# Patient Record
Sex: MALE | Race: WHITE
[De-identification: names, ages, dates, MRNs, and addresses within clinical notes are randomized per-mention and may not be internally consistent; named-entity substitution may affect disease eponyms.]

---

## 2020-10-29 ENCOUNTER — HOSPITAL ENCOUNTER (INPATIENT)
Dept: HOSPITAL 54 - ER | Age: 50
LOS: 10 days | Discharge: NURSING, REHAB OR LONG-TERM CARE | DRG: 950 | End: 2020-11-08
Attending: INTERNAL MEDICINE | Admitting: INTERNAL MEDICINE
Payer: MEDICAID

## 2020-10-29 VITALS — HEIGHT: 61 IN | WEIGHT: 105 LBS | BODY MASS INDEX: 19.83 KG/M2

## 2020-10-29 VITALS — SYSTOLIC BLOOD PRESSURE: 164 MMHG | DIASTOLIC BLOOD PRESSURE: 70 MMHG

## 2020-10-29 VITALS — DIASTOLIC BLOOD PRESSURE: 70 MMHG | SYSTOLIC BLOOD PRESSURE: 164 MMHG

## 2020-10-29 VITALS — SYSTOLIC BLOOD PRESSURE: 136 MMHG | DIASTOLIC BLOOD PRESSURE: 50 MMHG

## 2020-10-29 VITALS — DIASTOLIC BLOOD PRESSURE: 47 MMHG | SYSTOLIC BLOOD PRESSURE: 119 MMHG

## 2020-10-29 VITALS — DIASTOLIC BLOOD PRESSURE: 62 MMHG | SYSTOLIC BLOOD PRESSURE: 155 MMHG

## 2020-10-29 VITALS — SYSTOLIC BLOOD PRESSURE: 159 MMHG | DIASTOLIC BLOOD PRESSURE: 49 MMHG

## 2020-10-29 VITALS — DIASTOLIC BLOOD PRESSURE: 58 MMHG | SYSTOLIC BLOOD PRESSURE: 160 MMHG

## 2020-10-29 DIAGNOSIS — Z83.3: ICD-10-CM

## 2020-10-29 DIAGNOSIS — K21.9: ICD-10-CM

## 2020-10-29 DIAGNOSIS — Z74.09: ICD-10-CM

## 2020-10-29 DIAGNOSIS — I50.41: ICD-10-CM

## 2020-10-29 DIAGNOSIS — E78.5: ICD-10-CM

## 2020-10-29 DIAGNOSIS — M86.9: ICD-10-CM

## 2020-10-29 DIAGNOSIS — E11.22: ICD-10-CM

## 2020-10-29 DIAGNOSIS — E11.621: ICD-10-CM

## 2020-10-29 DIAGNOSIS — E11.52: ICD-10-CM

## 2020-10-29 DIAGNOSIS — D47.3: ICD-10-CM

## 2020-10-29 DIAGNOSIS — I13.2: ICD-10-CM

## 2020-10-29 DIAGNOSIS — E44.0: ICD-10-CM

## 2020-10-29 DIAGNOSIS — L97.509: ICD-10-CM

## 2020-10-29 DIAGNOSIS — G82.50: ICD-10-CM

## 2020-10-29 DIAGNOSIS — E87.5: ICD-10-CM

## 2020-10-29 DIAGNOSIS — J96.01: Primary | ICD-10-CM

## 2020-10-29 DIAGNOSIS — D64.9: ICD-10-CM

## 2020-10-29 DIAGNOSIS — Z89.432: ICD-10-CM
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
## 2020-10-29 DIAGNOSIS — E11.65: ICD-10-CM

## 2020-10-29 DIAGNOSIS — I25.10: ICD-10-CM

## 2020-10-29 DIAGNOSIS — N18.6: ICD-10-CM

## 2020-10-29 DIAGNOSIS — D63.1: ICD-10-CM

## 2020-10-29 DIAGNOSIS — Z99.2: ICD-10-CM

## 2020-10-29 DIAGNOSIS — J15.9: ICD-10-CM

## 2020-10-29 DIAGNOSIS — E11.69: ICD-10-CM

## 2020-10-29 DIAGNOSIS — Z82.49: ICD-10-CM

## 2020-10-29 LAB
ALBUMIN SERPL BCP-MCNC: 2.3 G/DL (ref 3.4–5)
ALBUMIN SERPL BCP-MCNC: 2.7 G/DL (ref 3.4–5)
ALP SERPL-CCNC: 817 U/L (ref 46–116)
ALP SERPL-CCNC: 919 U/L (ref 46–116)
ALT SERPL W P-5'-P-CCNC: 20 U/L (ref 12–78)
ALT SERPL W P-5'-P-CCNC: 22 U/L (ref 12–78)
AST SERPL W P-5'-P-CCNC: 19 U/L (ref 15–37)
AST SERPL W P-5'-P-CCNC: 21 U/L (ref 15–37)
BASOPHILS # BLD AUTO: 0.1 /CMM (ref 0–0.2)
BASOPHILS # BLD AUTO: 0.2 /CMM (ref 0–0.2)
BASOPHILS NFR BLD AUTO: 0.9 % (ref 0–2)
BASOPHILS NFR BLD AUTO: 1.2 % (ref 0–2)
BILIRUB DIRECT SERPL-MCNC: 0.5 MG/DL (ref 0–0.2)
BILIRUB SERPL-MCNC: 1 MG/DL (ref 0.2–1)
BILIRUB SERPL-MCNC: 1.1 MG/DL (ref 0.2–1)
BUN SERPL-MCNC: 90 MG/DL (ref 7–18)
BUN SERPL-MCNC: 94 MG/DL (ref 7–18)
CALCIUM SERPL-MCNC: 8.6 MG/DL (ref 8.5–10.1)
CALCIUM SERPL-MCNC: 8.9 MG/DL (ref 8.5–10.1)
CHLORIDE SERPL-SCNC: 104 MMOL/L (ref 98–107)
CHLORIDE SERPL-SCNC: 99 MMOL/L (ref 98–107)
CK SERPL-CCNC: 80 U/L (ref 39–308)
CO2 SERPL-SCNC: 17 MMOL/L (ref 21–32)
CO2 SERPL-SCNC: 21 MMOL/L (ref 21–32)
CREAT SERPL-MCNC: 6.6 MG/DL (ref 0.6–1.3)
CREAT SERPL-MCNC: 6.6 MG/DL (ref 0.6–1.3)
CRP SERPL-MCNC: 26.6 MG/DL (ref 0–0.9)
D DIMER PPP FEU-MCNC: 4.12 MG/L(FEU (ref 0.17–0.5)
EOSINOPHIL NFR BLD AUTO: 0.8 % (ref 0–6)
EOSINOPHIL NFR BLD AUTO: 0.8 % (ref 0–6)
FERRITIN SERPL-MCNC: 3582 NG/ML (ref 8–388)
GLUCOSE SERPL-MCNC: 66 MG/DL (ref 74–106)
GLUCOSE SERPL-MCNC: 80 MG/DL (ref 74–106)
HCT VFR BLD AUTO: 23 % (ref 39–51)
HCT VFR BLD AUTO: 26 % (ref 39–51)
HGB BLD-MCNC: 7.1 G/DL (ref 13.5–17.5)
HGB BLD-MCNC: 7.6 G/DL (ref 13.5–17.5)
LYMPHOCYTES NFR BLD AUTO: 1.1 /CMM (ref 0.8–4.8)
LYMPHOCYTES NFR BLD AUTO: 17.8 % (ref 20–44)
LYMPHOCYTES NFR BLD AUTO: 2.7 /CMM (ref 0.8–4.8)
LYMPHOCYTES NFR BLD AUTO: 8.4 % (ref 20–44)
MAGNESIUM SERPL-MCNC: 2.6 MG/DL (ref 1.8–2.4)
MCHC RBC AUTO-ENTMCNC: 30 G/DL (ref 31–36)
MCHC RBC AUTO-ENTMCNC: 30 G/DL (ref 31–36)
MCV RBC AUTO: 87 FL (ref 80–96)
MCV RBC AUTO: 90 FL (ref 80–96)
MONOCYTES NFR BLD AUTO: 0.6 /CMM (ref 0.1–1.3)
MONOCYTES NFR BLD AUTO: 0.7 /CMM (ref 0.1–1.3)
MONOCYTES NFR BLD AUTO: 3.6 % (ref 2–12)
MONOCYTES NFR BLD AUTO: 4.9 % (ref 2–12)
NEUTROPHILS # BLD AUTO: 11.6 /CMM (ref 1.8–8.9)
NEUTROPHILS # BLD AUTO: 11.8 /CMM (ref 1.8–8.9)
NEUTROPHILS NFR BLD AUTO: 76.6 % (ref 43–81)
NEUTROPHILS NFR BLD AUTO: 85 % (ref 43–81)
NT-PROBNP SERPL-MCNC: (no result) PG/ML (ref 0–125)
PHOSPHATE SERPL-MCNC: 7.3 MG/DL (ref 2.5–4.9)
PLATELET # BLD AUTO: 775 /CMM (ref 150–450)
PLATELET # BLD AUTO: 824 /CMM (ref 150–450)
POTASSIUM SERPL-SCNC: 5.5 MMOL/L (ref 3.5–5.1)
POTASSIUM SERPL-SCNC: 7.1 MMOL/L (ref 3.5–5.1)
PROT SERPL-MCNC: 8.1 G/DL (ref 6.4–8.2)
PROT SERPL-MCNC: 9 G/DL (ref 6.4–8.2)
RBC # BLD AUTO: 2.69 MIL/UL (ref 4.5–6)
RBC # BLD AUTO: 2.85 MIL/UL (ref 4.5–6)
SODIUM SERPL-SCNC: 137 MMOL/L (ref 136–145)
SODIUM SERPL-SCNC: 143 MMOL/L (ref 136–145)
WBC NRBC COR # BLD AUTO: 13.6 K/UL (ref 4.3–11)
WBC NRBC COR # BLD AUTO: 15.3 K/UL (ref 4.3–11)

## 2020-10-29 PROCEDURE — A6403 STERILE GAUZE>16 <= 48 SQ IN: HCPCS

## 2020-10-29 PROCEDURE — U0003 INFECTIOUS AGENT DETECTION BY NUCLEIC ACID (DNA OR RNA); SEVERE ACUTE RESPIRATORY SYNDROME CORONAVIRUS 2 (SARS-COV-2) (CORONAVIRUS DISEASE [COVID-19]), AMPLIFIED PROBE TECHNIQUE, MAKING USE OF HIGH THROUGHPUT TECHNOLOGIES AS DESCRIBED BY CMS-2020-01-R: HCPCS

## 2020-10-29 PROCEDURE — G0500 MOD SEDAT ENDO SERVICE >5YRS: HCPCS

## 2020-10-29 PROCEDURE — G0378 HOSPITAL OBSERVATION PER HR: HCPCS

## 2020-10-29 PROCEDURE — C1725 CATH, TRANSLUMIN NON-LASER: HCPCS

## 2020-10-29 PROCEDURE — C1769 GUIDE WIRE: HCPCS

## 2020-10-29 PROCEDURE — 5A1D70Z PERFORMANCE OF URINARY FILTRATION, INTERMITTENT, LESS THAN 6 HOURS PER DAY: ICD-10-PCS | Performed by: INTERNAL MEDICINE

## 2020-10-29 PROCEDURE — C1887 CATHETER, GUIDING: HCPCS

## 2020-10-29 PROCEDURE — C1714 CATH, TRANS ATHERECTOMY, DIR: HCPCS

## 2020-10-29 PROCEDURE — C1894 INTRO/SHEATH, NON-LASER: HCPCS

## 2020-10-29 RX ADMIN — HEPARIN SODIUM SCH UNITS: 5000 INJECTION INTRAVENOUS; SUBCUTANEOUS at 10:00

## 2020-10-29 RX ADMIN — CEFEPIME HYDROCHLORIDE SCH MLS/HR: 1 INJECTION, POWDER, FOR SOLUTION INTRAMUSCULAR; INTRAVENOUS at 10:00

## 2020-10-29 RX ADMIN — HEPARIN SODIUM SCH UNITS: 5000 INJECTION INTRAVENOUS; SUBCUTANEOUS at 20:47

## 2020-10-29 RX ADMIN — ATORVASTATIN CALCIUM SCH MG: 40 TABLET, FILM COATED ORAL at 21:53

## 2020-10-29 RX ADMIN — DOXYCYCLINE HYCLATE SCH MG: 100 TABLET, COATED ORAL at 09:59

## 2020-10-29 RX ADMIN — DOXYCYCLINE HYCLATE SCH MG: 100 TABLET, COATED ORAL at 17:19

## 2020-10-29 NOTE — NUR
BIBEMS FROM SNF C/O LOW O2 SAT 94% ON 5L/NC PER EMS REPORT SINCE 2230. O2 SAT 
90% ON RA DURING INITIAL ASSESSMENT; PT TO BED 8, PT ON O2 5L 94-96%. GOWNED, 
PENDING ER PROVIDER WOOD

## 2020-10-29 NOTE — NUR
TELE RN NOTES

SR-99 ON TELE MONITOR,MARCOS 164/70, MEDICATED WITH APRESOLINE 25MG PO FOR SBP>160 AS ORDERED.

## 2020-10-29 NOTE — NUR
patient transferred to ICU via acls protocol. No distress noted. Patient had a 
bowel movement, pericare provided. Endorsed to [place] [facility].

## 2020-10-29 NOTE — NUR
RN ADMISSION NOTE: RECEIVED PATIENT FROM ER AT 0855. PATIENT IS AAOX3, RESPONDS 
APPROPRIATELY. NO SIGNS OF ACUTE DISTRESS NOTED AT THIS TIME. SATING WELL ON 4L/MIN VIA NC. 
#18 ON LFA, C/D/I, FLUSHES WELL, NO SIGNS OF COMPLICATIONS NOTED, RCW HD CATH C/D/I. PATIENT 
IS CURRENTLY GETTING DIALYZED. WOUND PICTURES TAKEN AND PLACED IN CHART. SAFETY MEASURES 
IMPLEMENTED, BED IN LOWEST POSITION, LOCKED, SIDE RAILS UP, CALL LIGHT WITHIN REACH. WILL 
CONTINUE TO MONITOR PATIENT FOR CHANGES.

## 2020-10-29 NOTE — NUR
TELE RN NOTES

RECEIVED PATIENT FROM ICU , REPORT GIVEN BY SARTHAK RN. PATIENT ALERT ORIENTED X 4. NO ACUTE 
DISTRESS NOTED BREATHING UNLABORED.NO SOB NOTED, ON 4LPM VIA NASAL CANULA. SINUS RHYTHM ON 
TELEMETRY MONITOR. RIGHT UPPER CHEST DIALYSIS ACCESS WITH DRESSING CLEAN DRY AND INTACT. 
WITH MISSING BELONGINGS SHIRT AND PANTS, PER PATIENT IT WAS PLACED IN THE TRASH , PATIENT IS 
AWARE AND OK WITH IT BECAUSE IT'S DIRTY. OTHER BELONGINGS ACCOUNTED FOR.  NEEDS ATTENDED. 
[facility] CALL LIGHT WITHIN REACH. WILL CONTINUE TO MONITOR ACCORDINGLY.

## 2020-10-29 NOTE — NUR
TELE RN NOTES

RECEIVED RESTING COMFORTABLY ON BED,A/O X3,BREATHING REGULAR,NOT IN ANY FORM DISTRESS,WITH 
LFA SALINE LOCK #18 INTACT AND PATENT.RIGHT CHEST WALL CATHETER FOR HD ACCESS.DRESSING 
INTACT AND DRY.NOTED SECOND TO FOURTH DIGIT FINGER DRY AND GANGRENOUS,NO DISCHARGES 
NOTED,LEFT FOOT TRANS METATARSAL AMPUTATION AND RIGHT FOOT THIRD TO FIFTH DIGIT ALSO 
GANGRENOUS,NO DISCHARGES NOTES.FALL PRECAUTION OBSERVED,BED ON LOWEST POSITION AND 
LOCKED.CALL LIGHT IN REACH,NEEDS ANTICIPATED

## 2020-10-29 NOTE — NUR
TELE RN NOTES

NATALIIA CHANG PRESENT ON THE FLOOR MADE AWARE MEDICATION RECONCILIATION NEEDS TO BE 
DONE, MD SAID SHE WILL CHECK IT MADE AWARE OF MOST RECENT LABORATORY TEST RESULTED , BUN 94 
AND POTASSIUM 5.5, MD SAID SHE'S AWARE OF THE LABORATORY TEST RESULTS, NO NEW ORDERS MADE AT 
THIS TIME. yes

## 2020-10-29 NOTE — NUR
TELE RN NOTES

PATIENT IN BED ALERT ORIENTED X 4. NO ACUTE DISTRESS NOTED. BREATHING UNLABORED. NO SOB 
NOTED. IV ACCESS PATENT AND INTACT, NO REDNESS, NO SWELLING NOTED.NEEDS ATTENDED AND 
ANTICIPATED. KEPT CLEAN DRY AND COMFORTABLE. SAFETY MEASURES IN PLACE. CALL LIGHT WITHIN 
REACH. WILL ENDORSE TO NIGHT NURSE FOR CONTINUITY OF CARE.

## 2020-10-29 NOTE — NUR
PATIENT TRANSFERRED TO [facility] AT 11:55. REPORT GIVEN TO AUGUST YOUNG FOR CONTINUITY OF CARE. NO 
ACUTE DISTRESS NOTED AT TRANSFER. PATIENT REMAINS SR IN THE 90S ON BEDSIDE MONITOR. SAFETY 
MEASURES IMPLEMENTED, BED IN LOWEST POSITION, LOCKED, SIDE RAILS UP, CALL LIGHT WITHIN 
REACH. BELONGINGS TAKEN WITH PATIENT ALONG WITH CHART.

## 2020-10-30 VITALS — DIASTOLIC BLOOD PRESSURE: 69 MMHG | SYSTOLIC BLOOD PRESSURE: 117 MMHG

## 2020-10-30 VITALS — DIASTOLIC BLOOD PRESSURE: 52 MMHG | SYSTOLIC BLOOD PRESSURE: 133 MMHG

## 2020-10-30 VITALS — DIASTOLIC BLOOD PRESSURE: 68 MMHG | SYSTOLIC BLOOD PRESSURE: 146 MMHG

## 2020-10-30 VITALS — SYSTOLIC BLOOD PRESSURE: 137 MMHG | DIASTOLIC BLOOD PRESSURE: 71 MMHG

## 2020-10-30 VITALS — DIASTOLIC BLOOD PRESSURE: 71 MMHG | SYSTOLIC BLOOD PRESSURE: 137 MMHG

## 2020-10-30 VITALS — SYSTOLIC BLOOD PRESSURE: 154 MMHG | DIASTOLIC BLOOD PRESSURE: 89 MMHG

## 2020-10-30 VITALS — DIASTOLIC BLOOD PRESSURE: 72 MMHG | SYSTOLIC BLOOD PRESSURE: 148 MMHG

## 2020-10-30 LAB
ALBUMIN SERPL BCP-MCNC: 2.4 G/DL (ref 3.4–5)
ALP SERPL-CCNC: 762 U/L (ref 46–116)
ALT SERPL W P-5'-P-CCNC: 18 U/L (ref 12–78)
AST SERPL W P-5'-P-CCNC: 15 U/L (ref 15–37)
BASOPHILS # BLD AUTO: 0.1 /CMM (ref 0–0.2)
BASOPHILS NFR BLD AUTO: 1 % (ref 0–2)
BILIRUB SERPL-MCNC: 0.7 MG/DL (ref 0.2–1)
BUN SERPL-MCNC: 77 MG/DL (ref 7–18)
CALCIUM SERPL-MCNC: 8.8 MG/DL (ref 8.5–10.1)
CHLORIDE SERPL-SCNC: 99 MMOL/L (ref 98–107)
CO2 SERPL-SCNC: 25 MMOL/L (ref 21–32)
CREAT SERPL-MCNC: 5.6 MG/DL (ref 0.6–1.3)
EOSINOPHIL NFR BLD AUTO: 1.9 % (ref 0–6)
EOSINOPHIL NFR BLD MANUAL: 2 % (ref 0–4)
GLUCOSE SERPL-MCNC: 177 MG/DL (ref 74–106)
HCT VFR BLD AUTO: 26 % (ref 39–51)
HGB BLD-MCNC: 7.8 G/DL (ref 13.5–17.5)
IRON SERPL-MCNC: 46 UG/DL (ref 50–175)
LYMPHOCYTES NFR BLD AUTO: 16.8 % (ref 20–44)
LYMPHOCYTES NFR BLD AUTO: 2.3 /CMM (ref 0.8–4.8)
LYMPHOCYTES NFR BLD MANUAL: 7 % (ref 16–48)
MAGNESIUM SERPL-MCNC: 2.5 MG/DL (ref 1.8–2.4)
MCHC RBC AUTO-ENTMCNC: 31 G/DL (ref 31–36)
MCV RBC AUTO: 89 FL (ref 80–96)
MONOCYTES NFR BLD AUTO: 0.7 /CMM (ref 0.1–1.3)
MONOCYTES NFR BLD AUTO: 4.9 % (ref 2–12)
MONOCYTES NFR BLD MANUAL: 3 % (ref 0–11)
NEUTROPHILS # BLD AUTO: 10.3 /CMM (ref 1.8–8.9)
NEUTROPHILS NFR BLD AUTO: 75.4 % (ref 43–81)
NEUTS SEG NFR BLD MANUAL: 88 % (ref 42–76)
PHOSPHATE SERPL-MCNC: 7.1 MG/DL (ref 2.5–4.9)
PLATELET # BLD AUTO: 817 /CMM (ref 150–450)
POTASSIUM SERPL-SCNC: 4.2 MMOL/L (ref 3.5–5.1)
PROT SERPL-MCNC: 8.4 G/DL (ref 6.4–8.2)
RBC # BLD AUTO: 2.89 MIL/UL (ref 4.5–6)
SODIUM SERPL-SCNC: 139 MMOL/L (ref 136–145)
TIBC SERPL-MCNC: 149 UG/DL (ref 250–450)
WBC NRBC COR # BLD AUTO: 13.7 K/UL (ref 4.3–11)

## 2020-10-30 RX ADMIN — LABETALOL HCL SCH MG: 100 TABLET, FILM COATED ORAL at 17:00

## 2020-10-30 RX ADMIN — DOXYCYCLINE HYCLATE SCH MG: 100 TABLET, COATED ORAL at 10:05

## 2020-10-30 RX ADMIN — Medication SCH TAB: at 10:05

## 2020-10-30 RX ADMIN — DOXYCYCLINE HYCLATE SCH MG: 100 TABLET, COATED ORAL at 17:39

## 2020-10-30 RX ADMIN — CALCIUM CARBONATE (ANTACID) CHEW TAB 500 MG SCH MG: 500 CHEW TAB at 17:00

## 2020-10-30 RX ADMIN — CALCIUM CARBONATE (ANTACID) CHEW TAB 500 MG SCH MG: 500 CHEW TAB at 17:40

## 2020-10-30 RX ADMIN — FERROUS SULFATE TAB 325 MG (65 MG ELEMENTAL FE) SCH MG: 325 (65 FE) TAB at 10:04

## 2020-10-30 RX ADMIN — LABETALOL HCL SCH MG: 100 TABLET, FILM COATED ORAL at 17:39

## 2020-10-30 RX ADMIN — HEPARIN SODIUM SCH UNITS: 5000 INJECTION INTRAVENOUS; SUBCUTANEOUS at 10:06

## 2020-10-30 RX ADMIN — CLONIDINE HYDROCHLORIDE SCH MG: 0.1 TABLET ORAL at 17:00

## 2020-10-30 RX ADMIN — ATORVASTATIN CALCIUM SCH MG: 40 TABLET, FILM COATED ORAL at 21:01

## 2020-10-30 RX ADMIN — CALCIUM CARBONATE (ANTACID) CHEW TAB 500 MG SCH MG: 500 CHEW TAB at 12:28

## 2020-10-30 RX ADMIN — PANTOPRAZOLE SODIUM SCH MG: 40 TABLET, DELAYED RELEASE ORAL at 10:06

## 2020-10-30 RX ADMIN — CEFEPIME HYDROCHLORIDE SCH MLS/HR: 1 INJECTION, POWDER, FOR SOLUTION INTRAMUSCULAR; INTRAVENOUS at 10:11

## 2020-10-30 RX ADMIN — CLONIDINE HYDROCHLORIDE SCH MG: 0.1 TABLET ORAL at 17:39

## 2020-10-30 RX ADMIN — CALCIUM CARBONATE (ANTACID) CHEW TAB 500 MG SCH MG: 500 CHEW TAB at 10:05

## 2020-10-30 RX ADMIN — HEPARIN SODIUM SCH UNITS: 5000 INJECTION INTRAVENOUS; SUBCUTANEOUS at 21:02

## 2020-10-30 RX ADMIN — CLONIDINE HYDROCHLORIDE SCH MG: 0.1 TABLET ORAL at 10:06

## 2020-10-30 RX ADMIN — DOXYCYCLINE HYCLATE SCH MG: 100 TABLET, COATED ORAL at 17:00

## 2020-10-30 RX ADMIN — CEFEPIME HYDROCHLORIDE SCH MLS/HR: 1 INJECTION, POWDER, FOR SOLUTION INTRAMUSCULAR; INTRAVENOUS at 22:11

## 2020-10-30 RX ADMIN — AMLODIPINE BESYLATE SCH MG: 10 TABLET ORAL at 10:05

## 2020-10-30 RX ADMIN — LABETALOL HCL SCH MG: 100 TABLET, FILM COATED ORAL at 10:04

## 2020-10-30 RX ADMIN — OXYCODONE HYDROCHLORIDE AND ACETAMINOPHEN SCH MG: 500 TABLET ORAL at 10:05

## 2020-10-30 NOTE — NUR
RN NOTES

SLEEPING BUT AROUSABLE, NOT IN DISTRESS, NO PAIN NOTED, MORNING CARE RENDERED, CALL LIGHT 
WITHIN REACH, [id number], PT. NEEDS ATTENDED

## 2020-10-30 NOTE — NUR
MS RN OPENING NOTES: RECEIVED PATIENT IN BED, AWAKE. A/O X4. NO S/S OF DISTRESS NOTED. NO 
COMPLAIN OF PAIN. CALL LIGHT WITHIN REACH. INSTRUCTED PATIENT TO CALL FOR HELP, PATIENT 
VERBALIZED UNDERSTANDING, BED IN LOWEST AND LOCKED POSITION. WITH RIGHT HAND FINGERS 
NECROSIS AND BILATERAL FEET.

## 2020-10-30 NOTE — NUR
RN NOTES



RECEIVED PT. FROM MS-2, A/OX3, SR ON TELE MONITOR , NOT IN DISTRESS, DENIES PAIN, CALL LIGHT 
WITHIN REACH, SIDERAILSUPX2, CONTINUE TO MONITOR

## 2020-10-30 NOTE — NUR
MS RN NOTES



PATIENT RESTING COMFORTABLY IN BED. HOB ELEVATED. ON O2 AT 3-4L/MIN VIA NC JO WELL. NO S/S 
OF RESPIRATORY DISTRESS. RIGHT UPPER CHEST WALL HD CATH INTACT. AWAITING FOR HD TX. LFA SL # 
18 INTACT AND PATENT. DENIES ANY C/O PAIN NOR DISCOMFORT AT THIS TIME. BED IN LOWEST 
POSITION, LOCKED. BED ALARM ON. CALL LIGHT WITHIN REACH. IN NO APPARENT DISTRESS.

## 2020-10-30 NOTE — NUR
TELE RN NOTES

COVID TEST PCR RESULT NEGATIVE.PATIENT TRANSFERRED TO [facility],ROOM 313-2 FOR CONTINUITY OF 
CARE.REPORT GIVEN TO MILLICENT [facility] IN STABLE CONDITION.

## 2020-10-30 NOTE — NUR
TELE RN NOTES



RECEIVED PATIENT IN BED ASLEEP, AROUSABLE TO VERBAL AND TACTILE STIMULI. HOB ELEVATED. ON O2 
AT 4L/MIN VIA NC JO WELL WITHOUT SOB NOTED. ALERT AND ORIENTED X3. RIGHT UPPER CHEST WALL 
HD CATH INTACT. ON TELE MONITORING SR: 93. LFA SL # 18 INTACT AND PATENT. DENIES ANY C/O 
PAIN NOR DISCOMFORT AT THIS TIME. BED IN LOWEST POSITION, LOCKED. BED ALARM ON. CALL LIGHT 
WITHIN REACH. ABLE TO VERBALIZE NEEDS.

## 2020-10-31 VITALS — SYSTOLIC BLOOD PRESSURE: 127 MMHG | DIASTOLIC BLOOD PRESSURE: 58 MMHG

## 2020-10-31 VITALS — DIASTOLIC BLOOD PRESSURE: 65 MMHG | SYSTOLIC BLOOD PRESSURE: 141 MMHG

## 2020-10-31 VITALS — DIASTOLIC BLOOD PRESSURE: 65 MMHG | SYSTOLIC BLOOD PRESSURE: 132 MMHG

## 2020-10-31 VITALS — SYSTOLIC BLOOD PRESSURE: 141 MMHG | DIASTOLIC BLOOD PRESSURE: 65 MMHG

## 2020-10-31 LAB
BASOPHILS # BLD AUTO: 0.2 /CMM (ref 0–0.2)
BASOPHILS NFR BLD AUTO: 1.1 % (ref 0–2)
BUN SERPL-MCNC: 97 MG/DL (ref 7–18)
CALCIUM SERPL-MCNC: 8.3 MG/DL (ref 8.5–10.1)
CHLORIDE SERPL-SCNC: 95 MMOL/L (ref 98–107)
CO2 SERPL-SCNC: 20 MMOL/L (ref 21–32)
CREAT SERPL-MCNC: 6.6 MG/DL (ref 0.6–1.3)
EOSINOPHIL NFR BLD AUTO: 2.1 % (ref 0–6)
GLUCOSE SERPL-MCNC: 247 MG/DL (ref 74–106)
HCT VFR BLD AUTO: 25 % (ref 39–51)
HGB BLD-MCNC: 7.3 G/DL (ref 13.5–17.5)
LYMPHOCYTES NFR BLD AUTO: 15.2 % (ref 20–44)
LYMPHOCYTES NFR BLD AUTO: 2.2 /CMM (ref 0.8–4.8)
MAGNESIUM SERPL-MCNC: 2.3 MG/DL (ref 1.8–2.4)
MCHC RBC AUTO-ENTMCNC: 30 G/DL (ref 31–36)
MCV RBC AUTO: 85 FL (ref 80–96)
MONOCYTES NFR BLD AUTO: 0.8 /CMM (ref 0.1–1.3)
MONOCYTES NFR BLD AUTO: 5.3 % (ref 2–12)
NEUTROPHILS # BLD AUTO: 11.2 /CMM (ref 1.8–8.9)
NEUTROPHILS NFR BLD AUTO: 76.3 % (ref 43–81)
PHOSPHATE SERPL-MCNC: 8.7 MG/DL (ref 2.5–4.9)
PLATELET # BLD AUTO: 789 /CMM (ref 150–450)
POTASSIUM SERPL-SCNC: 5 MMOL/L (ref 3.5–5.1)
RBC # BLD AUTO: 2.9 MIL/UL (ref 4.5–6)
SODIUM SERPL-SCNC: 135 MMOL/L (ref 136–145)
VANCOMYCIN SERPL-MCNC: 16 UG/ML (ref 18–26)
WBC NRBC COR # BLD AUTO: 14.6 K/UL (ref 4.3–11)

## 2020-10-31 RX ADMIN — AMLODIPINE BESYLATE SCH MG: 10 TABLET ORAL at 08:29

## 2020-10-31 RX ADMIN — LABETALOL HCL SCH MG: 100 TABLET, FILM COATED ORAL at 16:50

## 2020-10-31 RX ADMIN — DOXYCYCLINE HYCLATE SCH MG: 100 TABLET, COATED ORAL at 08:31

## 2020-10-31 RX ADMIN — HEPARIN SODIUM SCH UNITS: 5000 INJECTION INTRAVENOUS; SUBCUTANEOUS at 20:47

## 2020-10-31 RX ADMIN — HEPARIN SODIUM SCH UNITS: 5000 INJECTION INTRAVENOUS; SUBCUTANEOUS at 08:35

## 2020-10-31 RX ADMIN — CALCIUM CARBONATE (ANTACID) CHEW TAB 500 MG SCH MG: 500 CHEW TAB at 16:50

## 2020-10-31 RX ADMIN — CEFEPIME HYDROCHLORIDE SCH MLS/HR: 1 INJECTION, POWDER, FOR SOLUTION INTRAMUSCULAR; INTRAVENOUS at 20:40

## 2020-10-31 RX ADMIN — LABETALOL HCL SCH MG: 100 TABLET, FILM COATED ORAL at 08:30

## 2020-10-31 RX ADMIN — DOXYCYCLINE HYCLATE SCH MG: 100 TABLET, COATED ORAL at 16:51

## 2020-10-31 RX ADMIN — CLONIDINE HYDROCHLORIDE SCH MG: 0.1 TABLET ORAL at 16:51

## 2020-10-31 RX ADMIN — FERROUS SULFATE TAB 325 MG (65 MG ELEMENTAL FE) SCH MG: 325 (65 FE) TAB at 08:26

## 2020-10-31 RX ADMIN — Medication SCH TAB: at 08:26

## 2020-10-31 RX ADMIN — ATORVASTATIN CALCIUM SCH MG: 40 TABLET, FILM COATED ORAL at 22:30

## 2020-10-31 RX ADMIN — OXYCODONE HYDROCHLORIDE AND ACETAMINOPHEN SCH MG: 500 TABLET ORAL at 08:26

## 2020-10-31 RX ADMIN — PANTOPRAZOLE SODIUM SCH MG: 40 TABLET, DELAYED RELEASE ORAL at 08:26

## 2020-10-31 RX ADMIN — CALCIUM CARBONATE (ANTACID) CHEW TAB 500 MG SCH MG: 500 CHEW TAB at 13:08

## 2020-10-31 RX ADMIN — CLONIDINE HYDROCHLORIDE SCH MG: 0.1 TABLET ORAL at 08:28

## 2020-10-31 RX ADMIN — CALCIUM CARBONATE (ANTACID) CHEW TAB 500 MG SCH MG: 500 CHEW TAB at 08:25

## 2020-10-31 NOTE — NUR
ENDORSED TO AUGUST FUENTES:THE PROCEDURE REAL AND AORTOGRAM ON MONDAY AT 0730 AND NPO POST 
MN-SUNDAY, AND HANDED OVER THE COPY OF THE NOTES FROM MARIANNE AJ.

## 2020-10-31 NOTE — NUR
MS RN CLOSING NOTES: PATIENT IN BED, AWAKE, A/O X4. NO S/SOF DISTRESS NOTED. NO COMPLAIN OF 
PAIN. CALL LIGHT WITHIN REACH, BED IN LOWEST AND LOCKED POSITION. NO ORDER FROM JANIA CAICEDO FOR 
THE CRITICAL VALUE PHOSPHORUS, CHARGE NURSE TREVIZO AWARE. HEMODIALYSIS WAS NOT DONE LAST 
NIGHT, CHARGE NURSE MADE AWARE. WILL ENDORSE TO THE NEXT SHIFT RN.

## 2020-10-31 NOTE — NUR
MS RN CLOSING NOTES



PATIENT IN BED, ASLEEP. AWAKE DURING THE DAY. PATIENT ON ROOM AIR; BREATHING IS EVEN AND 
UNLABORED; NO SOB NOTED DURING THE SHIFT. NO COMPLAINS OF PAIN DURING THE DAY. LFA IV ACCESS 
G#18 PRESENT AND INTACT. R UPPER CHEST WALL HD CATH ACCESS; PATIENT DIALYZED TODAY WITH 1500 
MLS OUTPUT. ALL NEEDS ATTENDED DURING THE DAY. SAFETY PRECAUTIONS IN PLACE; BED IN LOW 
POSITION AND LOCKED, RAILS UP X2, CALL LIGHT WITHIN REACH. WILL ENDORSE TO NIGHT SHIFT 
NURSE.

## 2020-10-31 NOTE — NUR
MS RN OPENING NOTES



RECEIVED PATIENT IN BED, ASLEEP. PATIENT ON ROOM AIR; BREATHING IS EVEN AND UNLABORED; NO 
SOB NOTED AT THIS TIME. NO SIGNS OF PAIN SUCH AS FACIAL GRIMACING, MOANING OR GUARDING. LFA 
IV ACCESS G#18 PRESENT AND INTACT. SAFETY PRECAUTIONS IN PLACE; BED IN LOW POSITION AND 
LOCKED, RAILS UP X2, CALL LIGHT WITHIN REACH. WILL CONTINUE TO MONITOR PATIENT.

## 2020-10-31 NOTE — NUR
MS RN OPENING NOTE 



RECEIVED PATIENT IN BED. A/OX3. TOLERATING ROOM AIR. RESPIRATIONS ARE EVEN AND UNLABORED. NO 
S/ SOB NOTED. NO C/O PAIN AT THIS TIME. IN NO APPARENT DISTRESS. IV ACCESS IN LFA #18 PATENT 
AND SALINE LOCKED. BED IS LOW AN D LOCKED, SEMI FOWLERS, SIDE RIALS UP X2. CALL LIGHT WITHIN 
REACH. WILL CONTINUE TO MONITOR.

## 2020-10-31 NOTE — NUR
MS RN NOTES



DIALYSIS PERFORMED BY DIALYSIS NURSE. 1500 MLS OUT. VS: /77 HR: 93

AT THIS TIME PATIENT IS SITTING IN BED AND HAVING LUNCH. 



WILL CONTINUE TO MONITOR.

## 2020-11-01 VITALS — DIASTOLIC BLOOD PRESSURE: 58 MMHG | SYSTOLIC BLOOD PRESSURE: 127 MMHG

## 2020-11-01 LAB
BASOPHILS # BLD AUTO: 0.2 /CMM (ref 0–0.2)
BASOPHILS NFR BLD AUTO: 1.2 % (ref 0–2)
BUN SERPL-MCNC: 67 MG/DL (ref 7–18)
CALCIUM SERPL-MCNC: 9 MG/DL (ref 8.5–10.1)
CHLORIDE SERPL-SCNC: 94 MMOL/L (ref 98–107)
CO2 SERPL-SCNC: 22 MMOL/L (ref 21–32)
CREAT SERPL-MCNC: 5 MG/DL (ref 0.6–1.3)
EOSINOPHIL NFR BLD AUTO: 2.3 % (ref 0–6)
GLUCOSE SERPL-MCNC: 203 MG/DL (ref 74–106)
HCT VFR BLD AUTO: 26 % (ref 39–51)
HGB BLD-MCNC: 7.7 G/DL (ref 13.5–17.5)
LYMPHOCYTES NFR BLD AUTO: 17.3 % (ref 20–44)
LYMPHOCYTES NFR BLD AUTO: 2.2 /CMM (ref 0.8–4.8)
MAGNESIUM SERPL-MCNC: 2.3 MG/DL (ref 1.8–2.4)
MCHC RBC AUTO-ENTMCNC: 30 G/DL (ref 31–36)
MCV RBC AUTO: 86 FL (ref 80–96)
MONOCYTES NFR BLD AUTO: 0.8 /CMM (ref 0.1–1.3)
MONOCYTES NFR BLD AUTO: 6.3 % (ref 2–12)
NEUTROPHILS # BLD AUTO: 9.3 /CMM (ref 1.8–8.9)
NEUTROPHILS NFR BLD AUTO: 72.9 % (ref 43–81)
PHOSPHATE SERPL-MCNC: 6 MG/DL (ref 2.5–4.9)
PLATELET # BLD AUTO: 770 /CMM (ref 150–450)
POTASSIUM SERPL-SCNC: 4.3 MMOL/L (ref 3.5–5.1)
RBC # BLD AUTO: 3.02 MIL/UL (ref 4.5–6)
SODIUM SERPL-SCNC: 134 MMOL/L (ref 136–145)
WBC NRBC COR # BLD AUTO: 12.7 K/UL (ref 4.3–11)

## 2020-11-01 RX ADMIN — INSULIN HUMAN PRN UNIT: 100 INJECTION, SOLUTION PARENTERAL at 21:15

## 2020-11-01 RX ADMIN — HEPARIN SODIUM SCH UNITS: 5000 INJECTION INTRAVENOUS; SUBCUTANEOUS at 09:15

## 2020-11-01 RX ADMIN — LABETALOL HCL SCH MG: 100 TABLET, FILM COATED ORAL at 16:53

## 2020-11-01 RX ADMIN — Medication SCH EACH: at 16:56

## 2020-11-01 RX ADMIN — Medication SCH TAB: at 09:20

## 2020-11-01 RX ADMIN — DOXYCYCLINE HYCLATE SCH MG: 100 TABLET, COATED ORAL at 09:20

## 2020-11-01 RX ADMIN — DOXYCYCLINE HYCLATE SCH MG: 100 TABLET, COATED ORAL at 16:56

## 2020-11-01 RX ADMIN — Medication SCH EACH: at 21:16

## 2020-11-01 RX ADMIN — AMLODIPINE BESYLATE SCH MG: 10 TABLET ORAL at 09:20

## 2020-11-01 RX ADMIN — PANTOPRAZOLE SODIUM SCH MG: 40 TABLET, DELAYED RELEASE ORAL at 09:20

## 2020-11-01 RX ADMIN — FERROUS SULFATE TAB 325 MG (65 MG ELEMENTAL FE) SCH MG: 325 (65 FE) TAB at 09:20

## 2020-11-01 RX ADMIN — ATORVASTATIN CALCIUM SCH MG: 40 TABLET, FILM COATED ORAL at 21:16

## 2020-11-01 RX ADMIN — INSULIN HUMAN PRN UNIT: 100 INJECTION, SOLUTION PARENTERAL at 17:06

## 2020-11-01 RX ADMIN — CALCIUM CARBONATE (ANTACID) CHEW TAB 500 MG SCH MG: 500 CHEW TAB at 12:09

## 2020-11-01 RX ADMIN — OXYCODONE HYDROCHLORIDE AND ACETAMINOPHEN SCH MG: 500 TABLET ORAL at 09:20

## 2020-11-01 RX ADMIN — CALCIUM CARBONATE (ANTACID) CHEW TAB 500 MG SCH MG: 500 CHEW TAB at 16:56

## 2020-11-01 RX ADMIN — HEPARIN SODIUM SCH UNITS: 5000 INJECTION INTRAVENOUS; SUBCUTANEOUS at 20:29

## 2020-11-01 RX ADMIN — CEFEPIME HYDROCHLORIDE SCH MLS/HR: 1 INJECTION, POWDER, FOR SOLUTION INTRAMUSCULAR; INTRAVENOUS at 21:16

## 2020-11-01 RX ADMIN — Medication SCH EACH: at 12:10

## 2020-11-01 RX ADMIN — CLONIDINE HYDROCHLORIDE SCH MG: 0.1 TABLET ORAL at 16:53

## 2020-11-01 RX ADMIN — CALCIUM CARBONATE (ANTACID) CHEW TAB 500 MG SCH MG: 500 CHEW TAB at 09:19

## 2020-11-01 RX ADMIN — LABETALOL HCL SCH MG: 100 TABLET, FILM COATED ORAL at 09:19

## 2020-11-01 RX ADMIN — INSULIN HUMAN PRN UNIT: 100 INJECTION, SOLUTION PARENTERAL at 12:22

## 2020-11-01 RX ADMIN — CLONIDINE HYDROCHLORIDE SCH MG: 0.1 TABLET ORAL at 09:20

## 2020-11-01 NOTE — NUR
MS RN CLOSING NOTE



PATIENT IN BED RESTING COMFORTABLY. PATIENT IN NO ACUTE DISTRESS. NO SOB NOTED. PATIENT 
BREATHING IS EVEN AND UNLABORED. EXPLAINED ALL DUE MEDS. PATIENT STATES NO PAIN AT THIS 
TIME. PATIENT KEPT CLEAN, DRY, AND COMFORTABLE THROUGHOUT SHIFT. NEEDS AND CONCERNS 
ADDRESSED. EDUCATED TO PATIENT TO REMAIN NPO PAST MIDNIGHT FOR PROCEDURE TOMORROW AM. 
PATIENT VERBALIZED UNDERSTANDING.  PATIENT BED ALARM IS ON. PATIENT SAFETY PRECAUTIONS IN 
PLACE. PATIENT BED IS LOCKED AND IN LOWEST POSITION. CALL LIGHT WITHIN REACH. WILL ENDORSE 
CARE TO PM SHIFT FOR SHAGGY.

## 2020-11-01 NOTE — NUR
SASCHA NP ASKED WHETHER TO HOLD SCHEDULED HEPARIN DOSE TONIGHT PT SCHEDULED FOR REAL IN THE 
AM. INFORMED TO HOLD TONIGHTS HEPARIN.

## 2020-11-01 NOTE — NUR
MS RN OPENING NOTE



PATIENT IN BED RESTING COMFORTABLY. PATIENT IN NO ACUTE DISTRESS. NO SOB NOTED. PATIENT 
BREATHING IS EVEN AND UNLABORED. PATIENT BED ALARM IS ON. HOB IS ELEVATED. PATIENT SAFETY 
PRECAUTIONS IN PLACE. PATIENT BED IS LOCKED AND IN LOWEST POSITION. CALL LIGHT WITHIN REACH. 
WILL CONTINUE TO MONITOR.

## 2020-11-01 NOTE — NUR
MS RN NOTE



SPOKE WITH DR. BANKS ABOUT PATIENTS HISTORY OF DIABETES. MD ORDER FOR MILD SLIDING SCALE 
SET ACHS.

## 2020-11-01 NOTE — NUR
MS RN CLOSING NOTE 



PATIENT IS RESTING IN BED. A/OX3. REMAINS TOLERATING ROOM AIR. NO RESP DISTRESS NOTED. NO 
C/O PAIN T/O SHIFT. NO DISTRESS. IV ACCESS MAINTAINED IN LFA #18 PATENT AND SALINE LOCKED. 
BED REMAINS LOW AND LOCKED, SEMI FOWLERS, SIDE RIALS UP X2. CALL LIGHT WITHIN REACH. WILL 
ENDORSE TO NEXT SHIFT.

## 2020-11-01 NOTE — NUR
MS RN NOTE



PER CHARGE NURSE MONIE, SHE SPOKE WITH DR. SALGUERO AND PATIENT IS TO REMAIN NPO AFTER 
MIDNIGHT. OKAY TO EAT DINNER TONIGHT. SUPERVISOR ONIEL AWARE OF REAL PROCEDURE TOMORROW AM.

## 2020-11-02 VITALS — SYSTOLIC BLOOD PRESSURE: 121 MMHG | DIASTOLIC BLOOD PRESSURE: 49 MMHG

## 2020-11-02 VITALS — DIASTOLIC BLOOD PRESSURE: 63 MMHG | SYSTOLIC BLOOD PRESSURE: 113 MMHG

## 2020-11-02 VITALS — DIASTOLIC BLOOD PRESSURE: 58 MMHG | SYSTOLIC BLOOD PRESSURE: 124 MMHG

## 2020-11-02 VITALS — DIASTOLIC BLOOD PRESSURE: 60 MMHG | SYSTOLIC BLOOD PRESSURE: 115 MMHG

## 2020-11-02 VITALS — DIASTOLIC BLOOD PRESSURE: 52 MMHG | SYSTOLIC BLOOD PRESSURE: 136 MMHG

## 2020-11-02 VITALS — DIASTOLIC BLOOD PRESSURE: 62 MMHG | SYSTOLIC BLOOD PRESSURE: 119 MMHG

## 2020-11-02 VITALS — DIASTOLIC BLOOD PRESSURE: 65 MMHG | SYSTOLIC BLOOD PRESSURE: 122 MMHG

## 2020-11-02 VITALS — SYSTOLIC BLOOD PRESSURE: 130 MMHG | DIASTOLIC BLOOD PRESSURE: 73 MMHG

## 2020-11-02 VITALS — SYSTOLIC BLOOD PRESSURE: 132 MMHG | DIASTOLIC BLOOD PRESSURE: 66 MMHG

## 2020-11-02 VITALS — DIASTOLIC BLOOD PRESSURE: 65 MMHG | SYSTOLIC BLOOD PRESSURE: 127 MMHG

## 2020-11-02 VITALS — DIASTOLIC BLOOD PRESSURE: 51 MMHG | SYSTOLIC BLOOD PRESSURE: 122 MMHG

## 2020-11-02 VITALS — DIASTOLIC BLOOD PRESSURE: 54 MMHG | SYSTOLIC BLOOD PRESSURE: 127 MMHG

## 2020-11-02 VITALS — DIASTOLIC BLOOD PRESSURE: 55 MMHG | SYSTOLIC BLOOD PRESSURE: 135 MMHG

## 2020-11-02 VITALS — DIASTOLIC BLOOD PRESSURE: 59 MMHG | SYSTOLIC BLOOD PRESSURE: 125 MMHG

## 2020-11-02 VITALS — DIASTOLIC BLOOD PRESSURE: 58 MMHG | SYSTOLIC BLOOD PRESSURE: 132 MMHG

## 2020-11-02 VITALS — SYSTOLIC BLOOD PRESSURE: 134 MMHG | DIASTOLIC BLOOD PRESSURE: 63 MMHG

## 2020-11-02 VITALS — DIASTOLIC BLOOD PRESSURE: 58 MMHG | SYSTOLIC BLOOD PRESSURE: 123 MMHG

## 2020-11-02 VITALS — DIASTOLIC BLOOD PRESSURE: 76 MMHG | SYSTOLIC BLOOD PRESSURE: 122 MMHG

## 2020-11-02 LAB
BASOPHILS # BLD AUTO: 0.2 /CMM (ref 0–0.2)
BASOPHILS NFR BLD AUTO: 1.5 % (ref 0–2)
BUN SERPL-MCNC: 82 MG/DL (ref 7–18)
CALCIUM SERPL-MCNC: 8.8 MG/DL (ref 8.5–10.1)
CHLORIDE SERPL-SCNC: 93 MMOL/L (ref 98–107)
CO2 SERPL-SCNC: 22 MMOL/L (ref 21–32)
CREAT SERPL-MCNC: 5.8 MG/DL (ref 0.6–1.3)
EOSINOPHIL NFR BLD AUTO: 2.5 % (ref 0–6)
GLUCOSE SERPL-MCNC: 190 MG/DL (ref 74–106)
HCT VFR BLD AUTO: 24 % (ref 39–51)
HGB BLD-MCNC: 7.2 G/DL (ref 13.5–17.5)
LYMPHOCYTES NFR BLD AUTO: 14 % (ref 20–44)
LYMPHOCYTES NFR BLD AUTO: 2.2 /CMM (ref 0.8–4.8)
MCHC RBC AUTO-ENTMCNC: 30 G/DL (ref 31–36)
MCV RBC AUTO: 84 FL (ref 80–96)
MONOCYTES NFR BLD AUTO: 0.8 /CMM (ref 0.1–1.3)
MONOCYTES NFR BLD AUTO: 5.2 % (ref 2–12)
NEUTROPHILS # BLD AUTO: 12.2 /CMM (ref 1.8–8.9)
NEUTROPHILS NFR BLD AUTO: 76.8 % (ref 43–81)
PLATELET # BLD AUTO: 752 /CMM (ref 150–450)
POTASSIUM SERPL-SCNC: 5.1 MMOL/L (ref 3.5–5.1)
RBC # BLD AUTO: 2.84 MIL/UL (ref 4.5–6)
SODIUM SERPL-SCNC: 132 MMOL/L (ref 136–145)
WBC NRBC COR # BLD AUTO: 15.9 K/UL (ref 4.3–11)

## 2020-11-02 PROCEDURE — 04CR3ZZ EXTIRPATION OF MATTER FROM RIGHT POSTERIOR TIBIAL ARTERY, PERCUTANEOUS APPROACH: ICD-10-PCS | Performed by: INTERNAL MEDICINE

## 2020-11-02 PROCEDURE — 04CT3ZZ EXTIRPATION OF MATTER FROM RIGHT PERONEAL ARTERY, PERCUTANEOUS APPROACH: ICD-10-PCS | Performed by: INTERNAL MEDICINE

## 2020-11-02 PROCEDURE — 047R3ZZ DILATION OF RIGHT POSTERIOR TIBIAL ARTERY, PERCUTANEOUS APPROACH: ICD-10-PCS | Performed by: INTERNAL MEDICINE

## 2020-11-02 PROCEDURE — 4A023N7 MEASUREMENT OF CARDIAC SAMPLING AND PRESSURE, LEFT HEART, PERCUTANEOUS APPROACH: ICD-10-PCS | Performed by: INTERNAL MEDICINE

## 2020-11-02 PROCEDURE — B211YZZ FLUOROSCOPY OF MULTIPLE CORONARY ARTERIES USING OTHER CONTRAST: ICD-10-PCS | Performed by: INTERNAL MEDICINE

## 2020-11-02 PROCEDURE — 047T3ZZ DILATION OF RIGHT PERONEAL ARTERY, PERCUTANEOUS APPROACH: ICD-10-PCS | Performed by: INTERNAL MEDICINE

## 2020-11-02 RX ADMIN — INSULIN HUMAN PRN UNIT: 100 INJECTION, SOLUTION PARENTERAL at 17:56

## 2020-11-02 RX ADMIN — PANTOPRAZOLE SODIUM SCH MG: 40 TABLET, DELAYED RELEASE ORAL at 11:32

## 2020-11-02 RX ADMIN — Medication SCH EACH: at 17:35

## 2020-11-02 RX ADMIN — CLONIDINE HYDROCHLORIDE SCH MG: 0.1 TABLET ORAL at 09:00

## 2020-11-02 RX ADMIN — CLONIDINE HYDROCHLORIDE SCH MG: 0.1 TABLET ORAL at 17:00

## 2020-11-02 RX ADMIN — Medication SCH EACH: at 12:29

## 2020-11-02 RX ADMIN — OXYCODONE HYDROCHLORIDE AND ACETAMINOPHEN SCH MG: 500 TABLET ORAL at 11:32

## 2020-11-02 RX ADMIN — Medication SCH EACH: at 21:18

## 2020-11-02 RX ADMIN — Medication SCH TAB: at 11:34

## 2020-11-02 RX ADMIN — ATORVASTATIN CALCIUM SCH MG: 40 TABLET, FILM COATED ORAL at 21:03

## 2020-11-02 RX ADMIN — HEPARIN SODIUM SCH UNITS: 5000 INJECTION INTRAVENOUS; SUBCUTANEOUS at 21:04

## 2020-11-02 RX ADMIN — INSULIN HUMAN PRN UNIT: 100 INJECTION, SOLUTION PARENTERAL at 12:32

## 2020-11-02 RX ADMIN — DOXYCYCLINE HYCLATE SCH MG: 100 TABLET, COATED ORAL at 11:33

## 2020-11-02 RX ADMIN — CALCIUM CARBONATE (ANTACID) CHEW TAB 500 MG SCH MG: 500 CHEW TAB at 14:08

## 2020-11-02 RX ADMIN — AMLODIPINE BESYLATE SCH MG: 10 TABLET ORAL at 09:00

## 2020-11-02 RX ADMIN — CALCIUM CARBONATE (ANTACID) CHEW TAB 500 MG SCH MG: 500 CHEW TAB at 11:32

## 2020-11-02 RX ADMIN — AMLODIPINE BESYLATE SCH MG: 10 TABLET ORAL at 11:33

## 2020-11-02 RX ADMIN — LEVOFLOXACIN SCH MG: 250 TABLET, FILM COATED ORAL at 21:03

## 2020-11-02 RX ADMIN — HEPARIN SODIUM SCH UNITS: 5000 INJECTION INTRAVENOUS; SUBCUTANEOUS at 09:00

## 2020-11-02 RX ADMIN — FERROUS SULFATE TAB 325 MG (65 MG ELEMENTAL FE) SCH MG: 325 (65 FE) TAB at 11:32

## 2020-11-02 RX ADMIN — LABETALOL HCL SCH MG: 100 TABLET, FILM COATED ORAL at 17:00

## 2020-11-02 RX ADMIN — INSULIN HUMAN PRN UNIT: 100 INJECTION, SOLUTION PARENTERAL at 07:32

## 2020-11-02 RX ADMIN — CALCIUM CARBONATE (ANTACID) CHEW TAB 500 MG SCH MG: 500 CHEW TAB at 17:35

## 2020-11-02 RX ADMIN — LABETALOL HCL SCH MG: 100 TABLET, FILM COATED ORAL at 09:00

## 2020-11-02 RX ADMIN — Medication SCH EACH: at 07:32

## 2020-11-02 NOTE — NUR
ICU/RN: BLEEDING NOTED AT SHEATH SITE. CATH LAB TEAM CALLED, ASSESSED SITE AND PRESSURE 
APPLIED. ALSO INFORMED . PTT PENDING.

## 2020-11-02 NOTE — NUR
ICU/RN: DANGELO MCKINLEY RN ENDORSES BEDSIDE REPORT. ALL BELONGINGS BROUGHT TO ROOM 260. WILL 
CONTINUE CARE.

## 2020-11-02 NOTE — NUR
ICU/RN: CATH LAB TEAM AT BEDSIDE. ACT DONE, 158. REPEAT PTT PENDING. SHEATH PULLED OUT AT 
1340, PRESSURE APPLIED. TEGADERM DRESSING APPLIED, WILL MONITOR FOR BLEEDING. HD STILL 
ONGOING.

## 2020-11-02 NOTE — NUR
MS/RN   TRANSFER



PATIENT TRANSFERRED TO ICU, ROOM 260. REPORT GIVEN TO RN IN ICU, ALL PERSONAL BELONGINGS 
TAKEN TO UNIT.

## 2020-11-02 NOTE — NUR
ICU/RN INITIAL NOTES,AM



REPORT WILL BE ENDORSED TO NIGHT NURSE FOR SHAGGY. PT ALERT, AWAKE, FOLLOWS COMMANDS. ON ROOM 
AIR, NO ACUTE DISTRESS NOTED. PT S/P CARDIAC CATH. SHEATH REMOVED, SITE CLEAN/DRY/NO SIGNS 
OF BLEEDING NOTED. PT ANURIC, HD DONE TODAY, 1 LITER OUT. ALL NEEDS ATTENDED TO, SAFETY 
MEASURES TAKEN, BED IN LOW POSITION, SIDE RAILS UP, CALL LIGHT WITHIN REACH.

## 2020-11-03 VITALS — DIASTOLIC BLOOD PRESSURE: 72 MMHG | SYSTOLIC BLOOD PRESSURE: 132 MMHG

## 2020-11-03 VITALS — DIASTOLIC BLOOD PRESSURE: 45 MMHG | SYSTOLIC BLOOD PRESSURE: 126 MMHG

## 2020-11-03 VITALS — SYSTOLIC BLOOD PRESSURE: 108 MMHG | DIASTOLIC BLOOD PRESSURE: 48 MMHG

## 2020-11-03 VITALS — SYSTOLIC BLOOD PRESSURE: 133 MMHG | DIASTOLIC BLOOD PRESSURE: 57 MMHG

## 2020-11-03 VITALS — SYSTOLIC BLOOD PRESSURE: 122 MMHG | DIASTOLIC BLOOD PRESSURE: 54 MMHG

## 2020-11-03 VITALS — DIASTOLIC BLOOD PRESSURE: 49 MMHG | SYSTOLIC BLOOD PRESSURE: 130 MMHG

## 2020-11-03 VITALS — SYSTOLIC BLOOD PRESSURE: 132 MMHG | DIASTOLIC BLOOD PRESSURE: 67 MMHG

## 2020-11-03 VITALS — DIASTOLIC BLOOD PRESSURE: 59 MMHG | SYSTOLIC BLOOD PRESSURE: 128 MMHG

## 2020-11-03 VITALS — SYSTOLIC BLOOD PRESSURE: 125 MMHG | DIASTOLIC BLOOD PRESSURE: 62 MMHG

## 2020-11-03 VITALS — SYSTOLIC BLOOD PRESSURE: 135 MMHG | DIASTOLIC BLOOD PRESSURE: 52 MMHG

## 2020-11-03 VITALS — DIASTOLIC BLOOD PRESSURE: 54 MMHG | SYSTOLIC BLOOD PRESSURE: 122 MMHG

## 2020-11-03 VITALS — DIASTOLIC BLOOD PRESSURE: 66 MMHG | SYSTOLIC BLOOD PRESSURE: 115 MMHG

## 2020-11-03 VITALS — SYSTOLIC BLOOD PRESSURE: 123 MMHG | DIASTOLIC BLOOD PRESSURE: 59 MMHG

## 2020-11-03 VITALS — SYSTOLIC BLOOD PRESSURE: 109 MMHG | DIASTOLIC BLOOD PRESSURE: 58 MMHG

## 2020-11-03 VITALS — DIASTOLIC BLOOD PRESSURE: 52 MMHG | SYSTOLIC BLOOD PRESSURE: 166 MMHG

## 2020-11-03 VITALS — DIASTOLIC BLOOD PRESSURE: 65 MMHG | SYSTOLIC BLOOD PRESSURE: 132 MMHG

## 2020-11-03 VITALS — SYSTOLIC BLOOD PRESSURE: 128 MMHG | DIASTOLIC BLOOD PRESSURE: 59 MMHG

## 2020-11-03 VITALS — DIASTOLIC BLOOD PRESSURE: 67 MMHG | SYSTOLIC BLOOD PRESSURE: 132 MMHG

## 2020-11-03 LAB
BASOPHILS # BLD AUTO: 0.1 /CMM (ref 0–0.2)
BASOPHILS NFR BLD AUTO: 0.8 % (ref 0–2)
BUN SERPL-MCNC: 56 MG/DL (ref 7–18)
CALCIUM SERPL-MCNC: 9.3 MG/DL (ref 8.5–10.1)
CHLORIDE SERPL-SCNC: 95 MMOL/L (ref 98–107)
CO2 SERPL-SCNC: 23 MMOL/L (ref 21–32)
CREAT SERPL-MCNC: 4.6 MG/DL (ref 0.6–1.3)
EOSINOPHIL NFR BLD AUTO: 1.1 % (ref 0–6)
GLUCOSE SERPL-MCNC: 121 MG/DL (ref 74–106)
HCT VFR BLD AUTO: 23 % (ref 39–51)
HGB BLD-MCNC: 6.9 G/DL (ref 13.5–17.5)
LYMPHOCYTES NFR BLD AUTO: 1.6 /CMM (ref 0.8–4.8)
LYMPHOCYTES NFR BLD AUTO: 11.8 % (ref 20–44)
LYMPHOCYTES NFR BLD MANUAL: 11 % (ref 16–48)
MAGNESIUM SERPL-MCNC: 2.2 MG/DL (ref 1.8–2.4)
MCHC RBC AUTO-ENTMCNC: 30 G/DL (ref 31–36)
MCV RBC AUTO: 88 FL (ref 80–96)
MONOCYTES NFR BLD AUTO: 0.6 /CMM (ref 0.1–1.3)
MONOCYTES NFR BLD AUTO: 4.8 % (ref 2–12)
MONOCYTES NFR BLD MANUAL: 2 % (ref 0–11)
NEUTROPHILS # BLD AUTO: 11 /CMM (ref 1.8–8.9)
NEUTROPHILS NFR BLD AUTO: 81.5 % (ref 43–81)
NEUTS SEG NFR BLD MANUAL: 87 % (ref 42–76)
PHOSPHATE SERPL-MCNC: 5.9 MG/DL (ref 2.5–4.9)
PLATELET # BLD AUTO: 704 /CMM (ref 150–450)
POTASSIUM SERPL-SCNC: 4.9 MMOL/L (ref 3.5–5.1)
RBC # BLD AUTO: 2.59 MIL/UL (ref 4.5–6)
SODIUM SERPL-SCNC: 135 MMOL/L (ref 136–145)
WBC NRBC COR # BLD AUTO: 13.5 K/UL (ref 4.3–11)

## 2020-11-03 PROCEDURE — 30233N1 TRANSFUSION OF NONAUTOLOGOUS RED BLOOD CELLS INTO PERIPHERAL VEIN, PERCUTANEOUS APPROACH: ICD-10-PCS | Performed by: INTERNAL MEDICINE

## 2020-11-03 RX ADMIN — LABETALOL HCL SCH MG: 100 TABLET, FILM COATED ORAL at 16:43

## 2020-11-03 RX ADMIN — Medication SCH EACH: at 12:55

## 2020-11-03 RX ADMIN — Medication SCH EACH: at 07:44

## 2020-11-03 RX ADMIN — LABETALOL HCL SCH MG: 100 TABLET, FILM COATED ORAL at 08:30

## 2020-11-03 RX ADMIN — FERROUS SULFATE TAB 325 MG (65 MG ELEMENTAL FE) SCH MG: 325 (65 FE) TAB at 08:28

## 2020-11-03 RX ADMIN — CLONIDINE HYDROCHLORIDE SCH MG: 0.1 TABLET ORAL at 08:29

## 2020-11-03 RX ADMIN — HEPARIN SODIUM SCH UNITS: 5000 INJECTION INTRAVENOUS; SUBCUTANEOUS at 21:16

## 2020-11-03 RX ADMIN — INSULIN HUMAN PRN UNIT: 100 INJECTION, SOLUTION PARENTERAL at 12:47

## 2020-11-03 RX ADMIN — PANTOPRAZOLE SODIUM SCH MG: 40 TABLET, DELAYED RELEASE ORAL at 08:30

## 2020-11-03 RX ADMIN — CALCIUM CARBONATE (ANTACID) CHEW TAB 500 MG SCH MG: 500 CHEW TAB at 16:22

## 2020-11-03 RX ADMIN — AMLODIPINE BESYLATE SCH MG: 10 TABLET ORAL at 08:31

## 2020-11-03 RX ADMIN — Medication SCH EACH: at 22:23

## 2020-11-03 RX ADMIN — CLONIDINE HYDROCHLORIDE SCH MG: 0.1 TABLET ORAL at 16:22

## 2020-11-03 RX ADMIN — CALCIUM CARBONATE (ANTACID) CHEW TAB 500 MG SCH MG: 500 CHEW TAB at 08:28

## 2020-11-03 RX ADMIN — CALCIUM CARBONATE (ANTACID) CHEW TAB 500 MG SCH MG: 500 CHEW TAB at 13:09

## 2020-11-03 RX ADMIN — Medication SCH EACH: at 16:54

## 2020-11-03 RX ADMIN — HEPARIN SODIUM SCH UNITS: 5000 INJECTION INTRAVENOUS; SUBCUTANEOUS at 21:00

## 2020-11-03 RX ADMIN — CLONIDINE HYDROCHLORIDE SCH MG: 0.1 TABLET ORAL at 16:43

## 2020-11-03 RX ADMIN — LABETALOL HCL SCH MG: 100 TABLET, FILM COATED ORAL at 16:23

## 2020-11-03 RX ADMIN — ATORVASTATIN CALCIUM SCH MG: 40 TABLET, FILM COATED ORAL at 21:14

## 2020-11-03 RX ADMIN — Medication SCH TAB: at 08:30

## 2020-11-03 RX ADMIN — HEPARIN SODIUM SCH UNITS: 5000 INJECTION INTRAVENOUS; SUBCUTANEOUS at 08:31

## 2020-11-03 RX ADMIN — INSULIN HUMAN PRN UNIT: 100 INJECTION, SOLUTION PARENTERAL at 22:23

## 2020-11-03 RX ADMIN — OXYCODONE HYDROCHLORIDE AND ACETAMINOPHEN SCH MG: 500 TABLET ORAL at 08:30

## 2020-11-03 RX ADMIN — CALCIUM CARBONATE (ANTACID) CHEW TAB 500 MG SCH MG: 500 CHEW TAB at 16:44

## 2020-11-03 NOTE — NUR
WOUND CARE CONSULT: PT PRESENTS WITH SACRAL SCAR, DISCOLORATION OF FINGERS AND DRY NECROTIC 
TOES AND LEFT FOOT, PRESENT ON ADMISSION. DEFER TO PMD FOR POSSIBLE VASCULAR/DPM CONSULTS. 
PT IS INDEPENDENT WITH BED MOBILITY. WILL SEE PRN. MD IN AGREEMENT WITH PLAN OF CARE. 
CURRENT ERON SCORE IS 18. 

-------------------------------------------------------------------------------

Addendum: 11/03/20 at 0816 by TAHMINA RAMACHANDRAN WNDNU

-------------------------------------------------------------------------------

Amended: Links added.

## 2020-11-03 NOTE — NUR
RN NOTE

PATIENT REFUSED ZOFRAN AND AGREED TO TAKE THE PLAVIX. REFUSED APRESOLINE, CATAPRES, 
TRANDATE, AND  AND TUMS. EXPLAINED BENEFITS AND REASON FOR MEDICATIONS 3X BUT STILL REFUSED.

## 2020-11-03 NOTE — NUR
RN NOTE

ENDORSED TO NIGHT SHIFT NURSE TO FOLLOW UP WITH ANTONIO DOS SANTOS CARDIOLOGIST, IF NEED TO HOLD 
PLAVIX BEFORE STENT PLACEMENT IN THE AM.

## 2020-11-03 NOTE — NUR
RN NOTES

 PT IN BED ALERT, AWAKE, AND FOLLOWS COMMANDS. ON NC ON 2L, NO ACUTE DISTRESS NOTED.  PT IS 
ANURIC. LFA 20 G SALINE LOCK IS INTACT, NO S/S INFECTION OR INFILTRATION NOTED. RIGHT UPPER 
CHEST WALL HD IS NOTED. WOUND CONSULT ORDERED.SAFETY MEASUREMENTS ARE IMPLEMENTED PER 
HOSPITAL POLICY.  BED IS IN LOW POSITION, SIDE RAILS UPX2. CALL LIGHT WITHIN REACH. WILL 
CONTINUE TO MONITOR

## 2020-11-03 NOTE — NUR
RN  CLOSING NOTE

PATIENT IN BED, 2L NC, O2 SAT 98%, RESP FAILURE, PNA, ANEURIC, HG 6.9, GAVE 1 UNIT PRBC, A&O 
X4, ON TELE MONITOR, NPO ORDERED TO START AT MIDNIGHT EXCEPT STILL NEEDS PLAVIX, STENT 
PLACEMENT TOMORROW MORNING, NAUSEA SO REFUSED BP MEDS, SO S/S OF DISTRESS, NO SOB, L FOREARM 
 HEP LOCK, R U CHEST WALL HD CATH, BED IN LOWEST LOCKED POSITION, SAFETY MEASURES 
IMPLEMENTED, CALL LIGHT WITHIN REACH, WILL ENDORSE TO NIGHT SHIFT NURSE.

## 2020-11-03 NOTE — NUR
RN NOTES

 OK TO TRANSFER TO TELE . KAREN CHARGE NURSE INFORMED WAITING FOR THE BED. BLOOD TRANSFUSION 
WILL BE DONE THERE OK WITH CHARGE NURSE

## 2020-11-03 NOTE — NUR
TELE RN NOTE

STARTED ON BLOOD TRANSFUSION AS ORDERED ,NO ADVERSE REACTION NOTED ,WILL CONT TO MONITOR

## 2020-11-03 NOTE — NUR
icu rn. am care. oral care, bed bath given. linen changed. remaining same vents etting 
tolerated well. sat 98%, no acute distress noted, cardiac monitor showing nsr, hob elevated, 
will continue to monitor

## 2020-11-03 NOTE — NUR
RN INITIAL NOTE

PATIENT RECEIVED FROM ICU IN BED, NO S/S OF DISTRESS, ON 2L NC, O2 SAT 98%, NO SOB, A/O X4, 
ASSESSED HEAD TO TOE, R UPPER CHEST WALL HD CATH, L FOREARM IV PATENT DRY INTACT, CARDIAC 
DIET, TELE MONITOR APPLIED, BED IN LOWEST LOCKED POSITION, SAFETY MEASURES IMPLEMENTED, CALL 
LIGHT WITHIN REACH. WILL CONTINUE TO MONITOR.

## 2020-11-03 NOTE — NUR
RN  NOTE

[place] [facility] [place] [facility] BED 1 AND GAVE REPORT TO FRANCISCO . TRANSFERRED PT WITH 
ACLS PROTOCOL WITH TELE MONITOR AND OXYGEN NO S/S OF DISTRESS, ON 2L NC, O2 SAT 98%, NO SOB, 
A/O X4,

## 2020-11-03 NOTE — NUR
RN NOTE 

PATIENT REFUSED PLAVIX ORAL 300MG, NOTIFIED DR ALVAREZ, SAID IT WAS OK TO GIVE ZOFRAN AND CALL 
IF PATIENT STILL DOES NOT WANT TO TAKE  PLAVIX AFTER.

## 2020-11-03 NOTE — NUR
RN NOTE

CALLED DR ALVAREZ CARDIOLOGIST, STATED PATIENT WILL MOST LIKELY HAVE SURGERY FOR STENT 
PLACEMENT TOMORROW MORNING, SAID TO GIVE ORDER PLAVIX 300 MG, AND PLAVIX 75 DAILY, WAS 
NOTIFIED THAT THE PATIENT WAS ALREADY ON HEPARIN.

## 2020-11-03 NOTE — NUR
RN  OPENING  NOTES,

PATIENT IN BED,  A&O X4 ABLE TO VERBALIZED NEEDS AND CONCERNS, BREATHING EVEN AND UNLABORED, 
NO SOB/ACUTE RESPIRATORY DISTRESS NOTED, ON 2L NC, O2 SAT LEVEL >96%, NSR ON TELE MONITOR, 
S/P 1 UNIT PRBC TODAY, WILL BE NPO AFTER MIDNIGHT FOR POSSIBLE STENT PLACEMENT TOMORROW, 
PATIENT AWARE AND VERBALIZED UNDERSTANDING, LEFT FOREARM  S/L, PATENT AND INTACT,  RCW HD 
CATH IN PLACED, DRESSING INTACT, NO BLEEDING OR ABNORMALITY NOTED,  BED LOCKED AND LOWEST  
POSITION, ALL SAFETY MEASURES IMPLEMENTED, CALL LIGHT WITHIN REACH, WILL CONTINUE TO MONITOR 
CLOSELY.

## 2020-11-03 NOTE — NUR
RN NOTES,

PER DR STOLL CONTINUE TO ADMINISTRATION OF HEPARIN ADMINISTRATION,  BUT PATIENT REFUSED 
HEPARIN AND ALSO HG WAS LOW THIS MORNING, S/P ONE UNIT OF PRBC TODAY,  ALSO NOTED PATIENT 
SCRATCHING RIGHT FOOT PLANTAR AND SLIGHTLY BLEEDING FROM SITE PICTURE TAKEN AND PLACED ON 
CHART , WILL ORDER WOUND CONSUL,  EDUCATION PROVIDED TO PATIENT ABOUT AVOID SCRATCHING THE 
FOOT AND INFECTION RELATED,  AND HE VERBALIZED UNDERSTANDING.

## 2020-11-04 VITALS — SYSTOLIC BLOOD PRESSURE: 147 MMHG | DIASTOLIC BLOOD PRESSURE: 59 MMHG

## 2020-11-04 VITALS — DIASTOLIC BLOOD PRESSURE: 61 MMHG | SYSTOLIC BLOOD PRESSURE: 149 MMHG

## 2020-11-04 VITALS — DIASTOLIC BLOOD PRESSURE: 74 MMHG | SYSTOLIC BLOOD PRESSURE: 137 MMHG

## 2020-11-04 VITALS — DIASTOLIC BLOOD PRESSURE: 65 MMHG | SYSTOLIC BLOOD PRESSURE: 114 MMHG

## 2020-11-04 VITALS — DIASTOLIC BLOOD PRESSURE: 46 MMHG | SYSTOLIC BLOOD PRESSURE: 150 MMHG

## 2020-11-04 LAB
BASOPHILS # BLD AUTO: 0.1 /CMM (ref 0–0.2)
BASOPHILS NFR BLD AUTO: 0.8 % (ref 0–2)
BUN SERPL-MCNC: 73 MG/DL (ref 7–18)
CALCIUM SERPL-MCNC: 9.4 MG/DL (ref 8.5–10.1)
CHLORIDE SERPL-SCNC: 94 MMOL/L (ref 98–107)
CO2 SERPL-SCNC: 18 MMOL/L (ref 21–32)
CREAT SERPL-MCNC: 5.6 MG/DL (ref 0.6–1.3)
EOSINOPHIL NFR BLD AUTO: 1.2 % (ref 0–6)
GLUCOSE SERPL-MCNC: 98 MG/DL (ref 74–106)
HCT VFR BLD AUTO: 25 % (ref 39–51)
HGB BLD-MCNC: 7.6 G/DL (ref 13.5–17.5)
LYMPHOCYTES NFR BLD AUTO: 1.6 /CMM (ref 0.8–4.8)
LYMPHOCYTES NFR BLD AUTO: 10.4 % (ref 20–44)
MAGNESIUM SERPL-MCNC: 2.4 MG/DL (ref 1.8–2.4)
MCHC RBC AUTO-ENTMCNC: 30 G/DL (ref 31–36)
MCV RBC AUTO: 90 FL (ref 80–96)
MONOCYTES NFR BLD AUTO: 0.9 /CMM (ref 0.1–1.3)
MONOCYTES NFR BLD AUTO: 5.5 % (ref 2–12)
NEUTROPHILS # BLD AUTO: 12.8 /CMM (ref 1.8–8.9)
NEUTROPHILS NFR BLD AUTO: 82.1 % (ref 43–81)
PHOSPHATE SERPL-MCNC: 7.1 MG/DL (ref 2.5–4.9)
PLATELET # BLD AUTO: 640 /CMM (ref 150–450)
POTASSIUM SERPL-SCNC: 5.1 MMOL/L (ref 3.5–5.1)
RBC # BLD AUTO: 2.77 MIL/UL (ref 4.5–6)
SODIUM SERPL-SCNC: 134 MMOL/L (ref 136–145)
WBC NRBC COR # BLD AUTO: 15.6 K/UL (ref 4.3–11)

## 2020-11-04 RX ADMIN — Medication SCH EACH: at 17:54

## 2020-11-04 RX ADMIN — LABETALOL HCL SCH MG: 100 TABLET, FILM COATED ORAL at 09:00

## 2020-11-04 RX ADMIN — PANTOPRAZOLE SODIUM SCH MG: 40 TABLET, DELAYED RELEASE ORAL at 09:00

## 2020-11-04 RX ADMIN — Medication SCH EACH: at 07:30

## 2020-11-04 RX ADMIN — Medication SCH TAB: at 09:00

## 2020-11-04 RX ADMIN — OXYCODONE HYDROCHLORIDE AND ACETAMINOPHEN SCH MG: 500 TABLET ORAL at 09:00

## 2020-11-04 RX ADMIN — FERROUS SULFATE TAB 325 MG (65 MG ELEMENTAL FE) SCH MG: 325 (65 FE) TAB at 09:00

## 2020-11-04 RX ADMIN — INSULIN HUMAN PRN UNIT: 100 INJECTION, SOLUTION PARENTERAL at 21:52

## 2020-11-04 RX ADMIN — CALCIUM CARBONATE (ANTACID) CHEW TAB 500 MG SCH MG: 500 CHEW TAB at 17:49

## 2020-11-04 RX ADMIN — CLONIDINE HYDROCHLORIDE SCH MG: 0.1 TABLET ORAL at 09:00

## 2020-11-04 RX ADMIN — AMLODIPINE BESYLATE SCH MG: 10 TABLET ORAL at 09:00

## 2020-11-04 RX ADMIN — CALCIUM CARBONATE (ANTACID) CHEW TAB 500 MG SCH MG: 500 CHEW TAB at 09:00

## 2020-11-04 RX ADMIN — CALCIUM CARBONATE (ANTACID) CHEW TAB 500 MG SCH MG: 500 CHEW TAB at 13:21

## 2020-11-04 RX ADMIN — LEVOFLOXACIN SCH MG: 250 TABLET, FILM COATED ORAL at 20:17

## 2020-11-04 RX ADMIN — HEPARIN SODIUM SCH UNITS: 5000 INJECTION INTRAVENOUS; SUBCUTANEOUS at 09:00

## 2020-11-04 RX ADMIN — Medication SCH EACH: at 12:00

## 2020-11-04 RX ADMIN — HEPARIN SODIUM SCH UNITS: 5000 INJECTION INTRAVENOUS; SUBCUTANEOUS at 20:19

## 2020-11-04 RX ADMIN — CLONIDINE HYDROCHLORIDE SCH MG: 0.1 TABLET ORAL at 18:49

## 2020-11-04 RX ADMIN — ATORVASTATIN CALCIUM SCH MG: 40 TABLET, FILM COATED ORAL at 21:26

## 2020-11-04 RX ADMIN — Medication SCH EACH: at 21:26

## 2020-11-04 RX ADMIN — CLOPIDOGREL BISULFATE SCH MG: 75 TABLET, FILM COATED ORAL at 09:00

## 2020-11-04 RX ADMIN — LABETALOL HCL SCH MG: 100 TABLET, FILM COATED ORAL at 17:54

## 2020-11-04 NOTE — NUR
RN CLOSING NOTE

PATIENT RESTING IN BED, NO S/S OF DISTRESS, ON 4L NC, O2 SAT 94%, HEMODIALYSIS TODAY, 1L 
OUT, SO RESP DISTRESS, NO PAIN, GANGRENE BILAT FEET AND R HAND, IV L FA PATENT FLUSHES WELL, 
NPO AFTER MIDNIGHT, SURGERY POSTPONED TO 0730 TOMORROW, A/O X4, BED IN LOWEST LOCKED 
POSITION, SAFETY MEASURES IN PLACE, CALL LIGHT WITHIN REACH. WILL ENDORSE TO NIGHT SHIFT.

## 2020-11-04 NOTE — NUR
RN NOTE

EXPLAINED 3X THE BENEFIT AND REASONING FOR ALL MORNING MEDICATIONS BUT PATIENT STILL 
REFUSED. MD AWARE, NO  NEW ORDER.

## 2020-11-04 NOTE — NUR
PATIENT'S PROCEDURE WAS CANCELLED AND WAS RESCHEDULED FOR TOMORROW 0730. INFORMED PATIENT 
AND MD. CANCELLED NPO STATUS AND ORDERED DIET FOR LUNCH.

## 2020-11-04 NOTE — NUR
RN OPENING NOTE

RECEIVED PATIENT IN BED, SO S/S OF DISTRESS, SOME SOB RELIEVED WITH O2 NC 2L, O2 SAT 98%, 
A/O X4, ANURIC, DIAPER, GANGRENE ON R FOOT, L FOOT AMPUTATION, R HAND GANGRENE, NPO, BED IN 
LOWEST LOCKED POSITION, SAFETY MEASURES IN PLACE, CALL LIGHT WITHIN REACH. WILL CONTINUE TO 
MONITOR.

## 2020-11-04 NOTE — NUR
RN  OPENING  NOTES,

PATIENT IN BED, ASLEEP AT THIS TIME, AROUSES TO VERBAL STIMULI, BREATHING EVEN AND 
UNLABORED, NO SOB/ACUTE RESPIRATORY DISTRESS NOTED, ON 4L NC O2 AT THIS TIME, WITH O2 SAT 
LEVEL 94%, NSR ON TELE MONITOR, WITH HR AT 80S AT THIS TIME,  NPO SINCE MIDNIGHT FOR 
POSSIBLE STENT PLACEMENT TODAY, PATIENT C/O SHORTEST OF BREATH  THIS MORNING, AND INCREASED 
O2 ADMINISTRATION TO 4LPM PATIENT STATED FEELING BETTER,  OTHERWISE NO SIGNIFICANT CHANGE IN 
CONDITION, BED LOCKED AND LOWEST  POSITION, PATIENT ANURIC,  NO URINE OUTPUT NOTED, ALL 
SAFETY MEASURES IMPLEMENTED, CALL LIGHT WITHIN REACH, WILL ENDORSE CONTINUITY OF CARE TO 
ONCOMING NURSE.

## 2020-11-04 NOTE — NUR
RECEIVED PT ON BED AWAKE A/O X4 ON O2 4L SPO2 94% HAVE LFA# 20 PATENT AND FLUSHED WITH RCW 
HD CATH, SAFETY MEASURE MAINTAINED BED ON LOWEST POSITION AND LOCKED SIDE RAILS UP X2 CALL 
LIGHT WITHIN REACH WILL CONT TO MONITOR

## 2020-11-05 VITALS — SYSTOLIC BLOOD PRESSURE: 144 MMHG | DIASTOLIC BLOOD PRESSURE: 70 MMHG

## 2020-11-05 VITALS — DIASTOLIC BLOOD PRESSURE: 71 MMHG | SYSTOLIC BLOOD PRESSURE: 137 MMHG

## 2020-11-05 VITALS — DIASTOLIC BLOOD PRESSURE: 64 MMHG | SYSTOLIC BLOOD PRESSURE: 136 MMHG

## 2020-11-05 VITALS — SYSTOLIC BLOOD PRESSURE: 140 MMHG | DIASTOLIC BLOOD PRESSURE: 72 MMHG

## 2020-11-05 VITALS — SYSTOLIC BLOOD PRESSURE: 145 MMHG | DIASTOLIC BLOOD PRESSURE: 71 MMHG

## 2020-11-05 VITALS — DIASTOLIC BLOOD PRESSURE: 73 MMHG | SYSTOLIC BLOOD PRESSURE: 139 MMHG

## 2020-11-05 VITALS — DIASTOLIC BLOOD PRESSURE: 79 MMHG | SYSTOLIC BLOOD PRESSURE: 150 MMHG

## 2020-11-05 VITALS — SYSTOLIC BLOOD PRESSURE: 133 MMHG | DIASTOLIC BLOOD PRESSURE: 63 MMHG

## 2020-11-05 VITALS — DIASTOLIC BLOOD PRESSURE: 70 MMHG | SYSTOLIC BLOOD PRESSURE: 130 MMHG

## 2020-11-05 VITALS — DIASTOLIC BLOOD PRESSURE: 66 MMHG | SYSTOLIC BLOOD PRESSURE: 123 MMHG

## 2020-11-05 VITALS — DIASTOLIC BLOOD PRESSURE: 81 MMHG | SYSTOLIC BLOOD PRESSURE: 150 MMHG

## 2020-11-05 VITALS — SYSTOLIC BLOOD PRESSURE: 144 MMHG | DIASTOLIC BLOOD PRESSURE: 68 MMHG

## 2020-11-05 VITALS — SYSTOLIC BLOOD PRESSURE: 142 MMHG | DIASTOLIC BLOOD PRESSURE: 71 MMHG

## 2020-11-05 VITALS — SYSTOLIC BLOOD PRESSURE: 149 MMHG | DIASTOLIC BLOOD PRESSURE: 81 MMHG

## 2020-11-05 VITALS — DIASTOLIC BLOOD PRESSURE: 63 MMHG | SYSTOLIC BLOOD PRESSURE: 135 MMHG

## 2020-11-05 VITALS — DIASTOLIC BLOOD PRESSURE: 60 MMHG | SYSTOLIC BLOOD PRESSURE: 142 MMHG

## 2020-11-05 VITALS — DIASTOLIC BLOOD PRESSURE: 64 MMHG | SYSTOLIC BLOOD PRESSURE: 130 MMHG

## 2020-11-05 VITALS — DIASTOLIC BLOOD PRESSURE: 57 MMHG | SYSTOLIC BLOOD PRESSURE: 131 MMHG

## 2020-11-05 VITALS — DIASTOLIC BLOOD PRESSURE: 71 MMHG | SYSTOLIC BLOOD PRESSURE: 149 MMHG

## 2020-11-05 VITALS — SYSTOLIC BLOOD PRESSURE: 144 MMHG | DIASTOLIC BLOOD PRESSURE: 74 MMHG

## 2020-11-05 VITALS — SYSTOLIC BLOOD PRESSURE: 128 MMHG | DIASTOLIC BLOOD PRESSURE: 65 MMHG

## 2020-11-05 VITALS — SYSTOLIC BLOOD PRESSURE: 138 MMHG | DIASTOLIC BLOOD PRESSURE: 50 MMHG

## 2020-11-05 VITALS — DIASTOLIC BLOOD PRESSURE: 85 MMHG | SYSTOLIC BLOOD PRESSURE: 151 MMHG

## 2020-11-05 VITALS — SYSTOLIC BLOOD PRESSURE: 140 MMHG | DIASTOLIC BLOOD PRESSURE: 62 MMHG

## 2020-11-05 VITALS — SYSTOLIC BLOOD PRESSURE: 147 MMHG | DIASTOLIC BLOOD PRESSURE: 81 MMHG

## 2020-11-05 VITALS — SYSTOLIC BLOOD PRESSURE: 139 MMHG | DIASTOLIC BLOOD PRESSURE: 78 MMHG

## 2020-11-05 VITALS — SYSTOLIC BLOOD PRESSURE: 139 MMHG | DIASTOLIC BLOOD PRESSURE: 71 MMHG

## 2020-11-05 VITALS — DIASTOLIC BLOOD PRESSURE: 69 MMHG | SYSTOLIC BLOOD PRESSURE: 146 MMHG

## 2020-11-05 VITALS — SYSTOLIC BLOOD PRESSURE: 136 MMHG | DIASTOLIC BLOOD PRESSURE: 56 MMHG

## 2020-11-05 VITALS — DIASTOLIC BLOOD PRESSURE: 65 MMHG | SYSTOLIC BLOOD PRESSURE: 138 MMHG

## 2020-11-05 VITALS — DIASTOLIC BLOOD PRESSURE: 69 MMHG | SYSTOLIC BLOOD PRESSURE: 136 MMHG

## 2020-11-05 VITALS — DIASTOLIC BLOOD PRESSURE: 73 MMHG | SYSTOLIC BLOOD PRESSURE: 143 MMHG

## 2020-11-05 VITALS — DIASTOLIC BLOOD PRESSURE: 73 MMHG | SYSTOLIC BLOOD PRESSURE: 141 MMHG

## 2020-11-05 VITALS — DIASTOLIC BLOOD PRESSURE: 59 MMHG | SYSTOLIC BLOOD PRESSURE: 132 MMHG

## 2020-11-05 VITALS — DIASTOLIC BLOOD PRESSURE: 47 MMHG | SYSTOLIC BLOOD PRESSURE: 133 MMHG

## 2020-11-05 VITALS — SYSTOLIC BLOOD PRESSURE: 127 MMHG | DIASTOLIC BLOOD PRESSURE: 62 MMHG

## 2020-11-05 VITALS — DIASTOLIC BLOOD PRESSURE: 69 MMHG | SYSTOLIC BLOOD PRESSURE: 135 MMHG

## 2020-11-05 VITALS — DIASTOLIC BLOOD PRESSURE: 70 MMHG | SYSTOLIC BLOOD PRESSURE: 146 MMHG

## 2020-11-05 VITALS — SYSTOLIC BLOOD PRESSURE: 143 MMHG | DIASTOLIC BLOOD PRESSURE: 77 MMHG

## 2020-11-05 VITALS — DIASTOLIC BLOOD PRESSURE: 64 MMHG | SYSTOLIC BLOOD PRESSURE: 138 MMHG

## 2020-11-05 VITALS — DIASTOLIC BLOOD PRESSURE: 57 MMHG | SYSTOLIC BLOOD PRESSURE: 136 MMHG

## 2020-11-05 VITALS — SYSTOLIC BLOOD PRESSURE: 146 MMHG | DIASTOLIC BLOOD PRESSURE: 73 MMHG

## 2020-11-05 VITALS — DIASTOLIC BLOOD PRESSURE: 63 MMHG | SYSTOLIC BLOOD PRESSURE: 150 MMHG

## 2020-11-05 VITALS — DIASTOLIC BLOOD PRESSURE: 71 MMHG | SYSTOLIC BLOOD PRESSURE: 141 MMHG

## 2020-11-05 VITALS — SYSTOLIC BLOOD PRESSURE: 147 MMHG | DIASTOLIC BLOOD PRESSURE: 39 MMHG

## 2020-11-05 VITALS — DIASTOLIC BLOOD PRESSURE: 68 MMHG | SYSTOLIC BLOOD PRESSURE: 138 MMHG

## 2020-11-05 VITALS — SYSTOLIC BLOOD PRESSURE: 135 MMHG | DIASTOLIC BLOOD PRESSURE: 78 MMHG

## 2020-11-05 VITALS — DIASTOLIC BLOOD PRESSURE: 71 MMHG | SYSTOLIC BLOOD PRESSURE: 145 MMHG

## 2020-11-05 VITALS — SYSTOLIC BLOOD PRESSURE: 138 MMHG | DIASTOLIC BLOOD PRESSURE: 64 MMHG

## 2020-11-05 VITALS — DIASTOLIC BLOOD PRESSURE: 75 MMHG | SYSTOLIC BLOOD PRESSURE: 146 MMHG

## 2020-11-05 VITALS — DIASTOLIC BLOOD PRESSURE: 68 MMHG | SYSTOLIC BLOOD PRESSURE: 136 MMHG

## 2020-11-05 VITALS — SYSTOLIC BLOOD PRESSURE: 143 MMHG | DIASTOLIC BLOOD PRESSURE: 62 MMHG

## 2020-11-05 VITALS — SYSTOLIC BLOOD PRESSURE: 132 MMHG | DIASTOLIC BLOOD PRESSURE: 78 MMHG

## 2020-11-05 VITALS — DIASTOLIC BLOOD PRESSURE: 66 MMHG | SYSTOLIC BLOOD PRESSURE: 147 MMHG

## 2020-11-05 VITALS — SYSTOLIC BLOOD PRESSURE: 145 MMHG | DIASTOLIC BLOOD PRESSURE: 73 MMHG

## 2020-11-05 VITALS — SYSTOLIC BLOOD PRESSURE: 151 MMHG | DIASTOLIC BLOOD PRESSURE: 81 MMHG

## 2020-11-05 VITALS — SYSTOLIC BLOOD PRESSURE: 135 MMHG | DIASTOLIC BLOOD PRESSURE: 68 MMHG

## 2020-11-05 LAB
BASOPHILS # BLD AUTO: 0 /CMM (ref 0–0.2)
BASOPHILS NFR BLD AUTO: 0.3 % (ref 0–2)
BUN SERPL-MCNC: 53 MG/DL (ref 7–18)
CALCIUM SERPL-MCNC: 8.9 MG/DL (ref 8.5–10.1)
CHLORIDE SERPL-SCNC: 96 MMOL/L (ref 98–107)
CO2 SERPL-SCNC: 22 MMOL/L (ref 21–32)
CREAT SERPL-MCNC: 4.6 MG/DL (ref 0.6–1.3)
EOSINOPHIL NFR BLD AUTO: 1.9 % (ref 0–6)
GLUCOSE SERPL-MCNC: 77 MG/DL (ref 74–106)
HCT VFR BLD AUTO: 23 % (ref 39–51)
HGB BLD-MCNC: 6.9 G/DL (ref 13.5–17.5)
LYMPHOCYTES NFR BLD AUTO: 0.6 /CMM (ref 0.8–4.8)
LYMPHOCYTES NFR BLD AUTO: 4.6 % (ref 20–44)
LYMPHOCYTES NFR BLD MANUAL: 9 % (ref 16–48)
MAGNESIUM SERPL-MCNC: 2.2 MG/DL (ref 1.8–2.4)
MCHC RBC AUTO-ENTMCNC: 30 G/DL (ref 31–36)
MCV RBC AUTO: 88 FL (ref 80–96)
MONOCYTES NFR BLD AUTO: 1.4 /CMM (ref 0.1–1.3)
MONOCYTES NFR BLD AUTO: 11.2 % (ref 2–12)
MONOCYTES NFR BLD MANUAL: 12 % (ref 0–11)
NEUTROPHILS # BLD AUTO: 10.6 /CMM (ref 1.8–8.9)
NEUTROPHILS NFR BLD AUTO: 82 % (ref 43–81)
NEUTS SEG NFR BLD MANUAL: 79 % (ref 42–76)
PHOSPHATE SERPL-MCNC: 5.6 MG/DL (ref 2.5–4.9)
PLATELET # BLD AUTO: 552 /CMM (ref 150–450)
POTASSIUM SERPL-SCNC: 4.9 MMOL/L (ref 3.5–5.1)
RBC # BLD AUTO: 2.62 MIL/UL (ref 4.5–6)
SODIUM SERPL-SCNC: 136 MMOL/L (ref 136–145)
WBC NRBC COR # BLD AUTO: 12.9 K/UL (ref 4.3–11)

## 2020-11-05 PROCEDURE — 027034Z DILATION OF CORONARY ARTERY, ONE ARTERY WITH DRUG-ELUTING INTRALUMINAL DEVICE, PERCUTANEOUS APPROACH: ICD-10-PCS | Performed by: INTERNAL MEDICINE

## 2020-11-05 RX ADMIN — LABETALOL HCL SCH MG: 100 TABLET, FILM COATED ORAL at 09:44

## 2020-11-05 RX ADMIN — CALCIUM CARBONATE (ANTACID) CHEW TAB 500 MG SCH MG: 500 CHEW TAB at 17:00

## 2020-11-05 RX ADMIN — ASPIRIN 81 MG SCH MG: 81 TABLET ORAL at 12:22

## 2020-11-05 RX ADMIN — AMLODIPINE BESYLATE SCH MG: 10 TABLET ORAL at 09:44

## 2020-11-05 RX ADMIN — Medication SCH TAB: at 09:44

## 2020-11-05 RX ADMIN — LABETALOL HCL SCH MG: 100 TABLET, FILM COATED ORAL at 17:00

## 2020-11-05 RX ADMIN — CLONIDINE HYDROCHLORIDE SCH MG: 0.1 TABLET ORAL at 09:44

## 2020-11-05 RX ADMIN — Medication SCH EACH: at 17:05

## 2020-11-05 RX ADMIN — OXYCODONE HYDROCHLORIDE AND ACETAMINOPHEN SCH MG: 500 TABLET ORAL at 09:44

## 2020-11-05 RX ADMIN — CLOPIDOGREL BISULFATE SCH MG: 75 TABLET, FILM COATED ORAL at 09:00

## 2020-11-05 RX ADMIN — ATORVASTATIN CALCIUM SCH MG: 40 TABLET, FILM COATED ORAL at 21:42

## 2020-11-05 RX ADMIN — PANTOPRAZOLE SODIUM SCH MG: 40 TABLET, DELAYED RELEASE ORAL at 09:44

## 2020-11-05 RX ADMIN — Medication SCH EACH: at 07:30

## 2020-11-05 RX ADMIN — CALCIUM CARBONATE (ANTACID) CHEW TAB 500 MG SCH MG: 500 CHEW TAB at 09:44

## 2020-11-05 RX ADMIN — CALCIUM CARBONATE (ANTACID) CHEW TAB 500 MG SCH MG: 500 CHEW TAB at 12:22

## 2020-11-05 RX ADMIN — FERROUS SULFATE TAB 325 MG (65 MG ELEMENTAL FE) SCH MG: 325 (65 FE) TAB at 09:44

## 2020-11-05 RX ADMIN — Medication SCH EACH: at 12:00

## 2020-11-05 RX ADMIN — CLONIDINE HYDROCHLORIDE SCH MG: 0.1 TABLET ORAL at 17:01

## 2020-11-05 RX ADMIN — Medication SCH EACH: at 21:41

## 2020-11-05 NOTE — NUR
RN FROM CATHLAB  THE PT PT IS AWAKE A/O X3 VERBAL AND WITH ONGOING PRBC TRANSFUSION 
TOLERATING WELL WITH NO TRANSFUSION REACTION NOTED AT THIS TIME, PT LATEST BP /73 HR 
84 RR 20 SPO2 97% ON O2 4L VIA NC, GIVE REPORT TO CATH LAB RN FOR SHAGGY

## 2020-11-05 NOTE — NUR
TRANSFUSION OF PRBC ONGOING WITH CURRENT V/S 142/71 HR 83 RR 20 TEMP 98F WITH SPO2 95% PT IS 
AWAKE A/O X4 NO SIGN AND SYMPTOMS OF TRANSFUSION REACTION NOTED

## 2020-11-05 NOTE — NUR
RN NOTE

INITIAL BLOOD SUGAR THAT WAS CHECKED AT 2200 (114) WAS SCANNED FROM WRONG ID BAND THAT WAS 
ON THE CORRECT PATIENT. WILL RECHEKC BLOOD SUGAR USING CORRECT ID BAND. 

-------------------------------------------------------------------------------

Addendum: 11/06/20 at 0029 by LAINE ORTIZ RN

-------------------------------------------------------------------------------

ERROR: TIME CORRECTED 2340

## 2020-11-05 NOTE — NUR
RECEIVED BLOOD FROM LAB AND VERIFIED STARTED BLOOD TRANSFUSION OF PRBC PER MD ORDER PT IS 
AWAKE A/O X4 SPO2 93% OF 4L O2 VIA NC, WITH LATEST V/S 150/63 HR 83 RR20 TEMP 98.2 WILL CONT 
TO MONITOR THE PT

## 2020-11-05 NOTE — NUR
0445 RECEIVED CRITICAL HGB 6.9 FROM LAB, NP MAXIMINO MADE AWARE WITH ORDER TO TRANSFUSE 1 UNIT 
PRBC NOW.  ORDER NOTED.

## 2020-11-05 NOTE — NUR
received pt from cat lab, s/p PCI in LAD, femoral sheath removed by cat lab, no bleeding 
noted, SR, sat well on 3L 02, v/s stable, no chest pain verbalized.

## 2020-11-05 NOTE — NUR
pt is resting in the bed, a/o x4, follows commands, SR, sat well on 3L 02, no bleeding from 
femoral access site, v/s stable, no pain, pt cleaned and changed.

## 2020-11-06 VITALS — DIASTOLIC BLOOD PRESSURE: 69 MMHG | SYSTOLIC BLOOD PRESSURE: 152 MMHG

## 2020-11-06 VITALS — DIASTOLIC BLOOD PRESSURE: 65 MMHG | SYSTOLIC BLOOD PRESSURE: 86 MMHG

## 2020-11-06 VITALS — DIASTOLIC BLOOD PRESSURE: 76 MMHG | SYSTOLIC BLOOD PRESSURE: 141 MMHG

## 2020-11-06 VITALS — SYSTOLIC BLOOD PRESSURE: 152 MMHG | DIASTOLIC BLOOD PRESSURE: 83 MMHG

## 2020-11-06 VITALS — SYSTOLIC BLOOD PRESSURE: 148 MMHG | DIASTOLIC BLOOD PRESSURE: 72 MMHG

## 2020-11-06 VITALS — SYSTOLIC BLOOD PRESSURE: 118 MMHG | DIASTOLIC BLOOD PRESSURE: 72 MMHG

## 2020-11-06 VITALS — SYSTOLIC BLOOD PRESSURE: 151 MMHG | DIASTOLIC BLOOD PRESSURE: 76 MMHG

## 2020-11-06 VITALS — SYSTOLIC BLOOD PRESSURE: 153 MMHG | DIASTOLIC BLOOD PRESSURE: 78 MMHG

## 2020-11-06 VITALS — SYSTOLIC BLOOD PRESSURE: 136 MMHG | DIASTOLIC BLOOD PRESSURE: 70 MMHG

## 2020-11-06 VITALS — DIASTOLIC BLOOD PRESSURE: 71 MMHG | SYSTOLIC BLOOD PRESSURE: 145 MMHG

## 2020-11-06 VITALS — DIASTOLIC BLOOD PRESSURE: 76 MMHG | SYSTOLIC BLOOD PRESSURE: 153 MMHG

## 2020-11-06 VITALS — DIASTOLIC BLOOD PRESSURE: 59 MMHG | SYSTOLIC BLOOD PRESSURE: 127 MMHG

## 2020-11-06 VITALS — DIASTOLIC BLOOD PRESSURE: 48 MMHG | SYSTOLIC BLOOD PRESSURE: 124 MMHG

## 2020-11-06 VITALS — SYSTOLIC BLOOD PRESSURE: 138 MMHG | DIASTOLIC BLOOD PRESSURE: 76 MMHG

## 2020-11-06 VITALS — DIASTOLIC BLOOD PRESSURE: 75 MMHG | SYSTOLIC BLOOD PRESSURE: 145 MMHG

## 2020-11-06 VITALS — SYSTOLIC BLOOD PRESSURE: 151 MMHG | DIASTOLIC BLOOD PRESSURE: 80 MMHG

## 2020-11-06 VITALS — DIASTOLIC BLOOD PRESSURE: 68 MMHG | SYSTOLIC BLOOD PRESSURE: 137 MMHG

## 2020-11-06 VITALS — SYSTOLIC BLOOD PRESSURE: 137 MMHG | DIASTOLIC BLOOD PRESSURE: 47 MMHG

## 2020-11-06 VITALS — SYSTOLIC BLOOD PRESSURE: 139 MMHG | DIASTOLIC BLOOD PRESSURE: 66 MMHG

## 2020-11-06 VITALS — SYSTOLIC BLOOD PRESSURE: 155 MMHG | DIASTOLIC BLOOD PRESSURE: 65 MMHG

## 2020-11-06 VITALS — SYSTOLIC BLOOD PRESSURE: 126 MMHG | DIASTOLIC BLOOD PRESSURE: 51 MMHG

## 2020-11-06 VITALS — DIASTOLIC BLOOD PRESSURE: 73 MMHG | SYSTOLIC BLOOD PRESSURE: 145 MMHG

## 2020-11-06 VITALS — SYSTOLIC BLOOD PRESSURE: 141 MMHG | DIASTOLIC BLOOD PRESSURE: 76 MMHG

## 2020-11-06 VITALS — SYSTOLIC BLOOD PRESSURE: 143 MMHG | DIASTOLIC BLOOD PRESSURE: 77 MMHG

## 2020-11-06 VITALS — SYSTOLIC BLOOD PRESSURE: 147 MMHG | DIASTOLIC BLOOD PRESSURE: 77 MMHG

## 2020-11-06 VITALS — SYSTOLIC BLOOD PRESSURE: 165 MMHG | DIASTOLIC BLOOD PRESSURE: 70 MMHG

## 2020-11-06 VITALS — SYSTOLIC BLOOD PRESSURE: 132 MMHG | DIASTOLIC BLOOD PRESSURE: 63 MMHG

## 2020-11-06 VITALS — SYSTOLIC BLOOD PRESSURE: 124 MMHG | DIASTOLIC BLOOD PRESSURE: 51 MMHG

## 2020-11-06 VITALS — DIASTOLIC BLOOD PRESSURE: 79 MMHG | SYSTOLIC BLOOD PRESSURE: 145 MMHG

## 2020-11-06 VITALS — SYSTOLIC BLOOD PRESSURE: 146 MMHG | DIASTOLIC BLOOD PRESSURE: 73 MMHG

## 2020-11-06 VITALS — DIASTOLIC BLOOD PRESSURE: 74 MMHG | SYSTOLIC BLOOD PRESSURE: 148 MMHG

## 2020-11-06 VITALS — SYSTOLIC BLOOD PRESSURE: 143 MMHG | DIASTOLIC BLOOD PRESSURE: 79 MMHG

## 2020-11-06 VITALS — SYSTOLIC BLOOD PRESSURE: 161 MMHG | DIASTOLIC BLOOD PRESSURE: 77 MMHG

## 2020-11-06 VITALS — SYSTOLIC BLOOD PRESSURE: 153 MMHG | DIASTOLIC BLOOD PRESSURE: 86 MMHG

## 2020-11-06 VITALS — SYSTOLIC BLOOD PRESSURE: 113 MMHG | DIASTOLIC BLOOD PRESSURE: 53 MMHG

## 2020-11-06 VITALS — DIASTOLIC BLOOD PRESSURE: 69 MMHG | SYSTOLIC BLOOD PRESSURE: 147 MMHG

## 2020-11-06 VITALS — SYSTOLIC BLOOD PRESSURE: 119 MMHG | DIASTOLIC BLOOD PRESSURE: 49 MMHG

## 2020-11-06 VITALS — DIASTOLIC BLOOD PRESSURE: 66 MMHG | SYSTOLIC BLOOD PRESSURE: 141 MMHG

## 2020-11-06 VITALS — SYSTOLIC BLOOD PRESSURE: 155 MMHG | DIASTOLIC BLOOD PRESSURE: 73 MMHG

## 2020-11-06 VITALS — DIASTOLIC BLOOD PRESSURE: 72 MMHG | SYSTOLIC BLOOD PRESSURE: 144 MMHG

## 2020-11-06 VITALS — DIASTOLIC BLOOD PRESSURE: 64 MMHG | SYSTOLIC BLOOD PRESSURE: 136 MMHG

## 2020-11-06 VITALS — DIASTOLIC BLOOD PRESSURE: 71 MMHG | SYSTOLIC BLOOD PRESSURE: 138 MMHG

## 2020-11-06 VITALS — DIASTOLIC BLOOD PRESSURE: 88 MMHG | SYSTOLIC BLOOD PRESSURE: 147 MMHG

## 2020-11-06 VITALS — DIASTOLIC BLOOD PRESSURE: 88 MMHG | SYSTOLIC BLOOD PRESSURE: 153 MMHG

## 2020-11-06 VITALS — SYSTOLIC BLOOD PRESSURE: 137 MMHG | DIASTOLIC BLOOD PRESSURE: 71 MMHG

## 2020-11-06 VITALS — DIASTOLIC BLOOD PRESSURE: 64 MMHG | SYSTOLIC BLOOD PRESSURE: 125 MMHG

## 2020-11-06 VITALS — DIASTOLIC BLOOD PRESSURE: 76 MMHG | SYSTOLIC BLOOD PRESSURE: 142 MMHG

## 2020-11-06 VITALS — SYSTOLIC BLOOD PRESSURE: 135 MMHG | DIASTOLIC BLOOD PRESSURE: 68 MMHG

## 2020-11-06 VITALS — DIASTOLIC BLOOD PRESSURE: 77 MMHG | SYSTOLIC BLOOD PRESSURE: 142 MMHG

## 2020-11-06 VITALS — SYSTOLIC BLOOD PRESSURE: 152 MMHG | DIASTOLIC BLOOD PRESSURE: 78 MMHG

## 2020-11-06 VITALS — DIASTOLIC BLOOD PRESSURE: 59 MMHG | SYSTOLIC BLOOD PRESSURE: 130 MMHG

## 2020-11-06 VITALS — SYSTOLIC BLOOD PRESSURE: 155 MMHG | DIASTOLIC BLOOD PRESSURE: 84 MMHG

## 2020-11-06 VITALS — DIASTOLIC BLOOD PRESSURE: 65 MMHG | SYSTOLIC BLOOD PRESSURE: 125 MMHG

## 2020-11-06 VITALS — DIASTOLIC BLOOD PRESSURE: 85 MMHG | SYSTOLIC BLOOD PRESSURE: 154 MMHG

## 2020-11-06 VITALS — DIASTOLIC BLOOD PRESSURE: 68 MMHG | SYSTOLIC BLOOD PRESSURE: 138 MMHG

## 2020-11-06 VITALS — SYSTOLIC BLOOD PRESSURE: 147 MMHG | DIASTOLIC BLOOD PRESSURE: 72 MMHG

## 2020-11-06 VITALS — SYSTOLIC BLOOD PRESSURE: 138 MMHG | DIASTOLIC BLOOD PRESSURE: 69 MMHG

## 2020-11-06 VITALS — DIASTOLIC BLOOD PRESSURE: 74 MMHG | SYSTOLIC BLOOD PRESSURE: 147 MMHG

## 2020-11-06 VITALS — DIASTOLIC BLOOD PRESSURE: 74 MMHG | SYSTOLIC BLOOD PRESSURE: 126 MMHG

## 2020-11-06 LAB
BASOPHILS # BLD AUTO: 0.1 /CMM (ref 0–0.2)
BASOPHILS NFR BLD AUTO: 0.4 % (ref 0–2)
BUN SERPL-MCNC: 68 MG/DL (ref 7–18)
CALCIUM SERPL-MCNC: 9.1 MG/DL (ref 8.5–10.1)
CHLORIDE SERPL-SCNC: 96 MMOL/L (ref 98–107)
CO2 SERPL-SCNC: 20 MMOL/L (ref 21–32)
CREAT SERPL-MCNC: 5.7 MG/DL (ref 0.6–1.3)
EOSINOPHIL NFR BLD AUTO: 1.4 % (ref 0–6)
GLUCOSE SERPL-MCNC: 159 MG/DL (ref 74–106)
HCT VFR BLD AUTO: 28 % (ref 39–51)
HGB BLD-MCNC: 8.5 G/DL (ref 13.5–17.5)
LYMPHOCYTES NFR BLD AUTO: 1.1 /CMM (ref 0.8–4.8)
LYMPHOCYTES NFR BLD AUTO: 8.5 % (ref 20–44)
MAGNESIUM SERPL-MCNC: 2.3 MG/DL (ref 1.8–2.4)
MCHC RBC AUTO-ENTMCNC: 30 G/DL (ref 31–36)
MCV RBC AUTO: 90 FL (ref 80–96)
MONOCYTES NFR BLD AUTO: 0.7 /CMM (ref 0.1–1.3)
MONOCYTES NFR BLD AUTO: 5.2 % (ref 2–12)
NEUTROPHILS # BLD AUTO: 10.9 /CMM (ref 1.8–8.9)
NEUTROPHILS NFR BLD AUTO: 84.5 % (ref 43–81)
PHOSPHATE SERPL-MCNC: 7.5 MG/DL (ref 2.5–4.9)
PLATELET # BLD AUTO: 529 /CMM (ref 150–450)
POTASSIUM SERPL-SCNC: 5.3 MMOL/L (ref 3.5–5.1)
RBC # BLD AUTO: 3.09 MIL/UL (ref 4.5–6)
SODIUM SERPL-SCNC: 135 MMOL/L (ref 136–145)
WBC NRBC COR # BLD AUTO: 13 K/UL (ref 4.3–11)

## 2020-11-06 RX ADMIN — Medication SCH EACH: at 21:22

## 2020-11-06 RX ADMIN — OXYCODONE HYDROCHLORIDE AND ACETAMINOPHEN SCH MG: 500 TABLET ORAL at 09:11

## 2020-11-06 RX ADMIN — ASPIRIN 81 MG SCH MG: 81 TABLET ORAL at 09:10

## 2020-11-06 RX ADMIN — ATORVASTATIN CALCIUM SCH MG: 40 TABLET, FILM COATED ORAL at 21:22

## 2020-11-06 RX ADMIN — CALCIUM CARBONATE (ANTACID) CHEW TAB 500 MG SCH MG: 500 CHEW TAB at 16:40

## 2020-11-06 RX ADMIN — CLOPIDOGREL BISULFATE SCH MG: 75 TABLET, FILM COATED ORAL at 09:16

## 2020-11-06 RX ADMIN — PANTOPRAZOLE SODIUM SCH MG: 40 TABLET, DELAYED RELEASE ORAL at 09:12

## 2020-11-06 RX ADMIN — CALCIUM CARBONATE (ANTACID) CHEW TAB 500 MG SCH MG: 500 CHEW TAB at 09:10

## 2020-11-06 RX ADMIN — CLONIDINE HYDROCHLORIDE SCH MG: 0.1 TABLET ORAL at 16:43

## 2020-11-06 RX ADMIN — Medication SCH EACH: at 11:55

## 2020-11-06 RX ADMIN — LABETALOL HCL SCH MG: 100 TABLET, FILM COATED ORAL at 09:10

## 2020-11-06 RX ADMIN — CLONIDINE HYDROCHLORIDE SCH MG: 0.1 TABLET ORAL at 09:11

## 2020-11-06 RX ADMIN — FERROUS SULFATE TAB 325 MG (65 MG ELEMENTAL FE) SCH MG: 325 (65 FE) TAB at 09:12

## 2020-11-06 RX ADMIN — CALCIUM CARBONATE (ANTACID) CHEW TAB 500 MG SCH MG: 500 CHEW TAB at 13:18

## 2020-11-06 RX ADMIN — Medication SCH EACH: at 17:07

## 2020-11-06 RX ADMIN — Medication SCH TAB: at 09:11

## 2020-11-06 RX ADMIN — LABETALOL HCL SCH MG: 100 TABLET, FILM COATED ORAL at 16:43

## 2020-11-06 RX ADMIN — ACETAMINOPHEN PRN MG: 325 TABLET ORAL at 15:13

## 2020-11-06 RX ADMIN — Medication SCH EACH: at 08:20

## 2020-11-06 RX ADMIN — AMLODIPINE BESYLATE SCH MG: 10 TABLET ORAL at 09:11

## 2020-11-06 RX ADMIN — INSULIN HUMAN PRN UNIT: 100 INJECTION, SOLUTION PARENTERAL at 17:16

## 2020-11-06 NOTE — NUR
RN NOTES



PATIENT RECEIVED FROM MEAGAN KOCH FROM ICU. PATIENT ALERT AND ORIENTED X 4. ON NASAL CANNULA 3 
LITERS TOLERATING SETTING WELL WITH SPO2 AT 96%. PATIENT DRESSINGS INTACT ON BILATERAL LOWER 
FEET AND RIGHT HAND NO LEAKAGE OR DRAINAGE PRESENT. IV ACCESS INTACT AND PATENT. PATIENT 
PRESENTS WITH NO PAIN OR DISCOMFORT AT THIS TIME. HEMODIALYSIS RN AT BEDSIDE AWAITING FOR 
PATIENT TO START DIALYSIS. PER RN, 3 UNITS OF INSULIN GIVEN TO PATIENT DUE TO SLIDING SCALE 
PROTOCOL. VITAL SIGNS WNL. SAFETY PRECAUTIONS IMPLEMENTED WITH BED LOCKED, BED IN THE LOWEST 
POSITION, BILATERAL SIDE RAILS UP, BED ALARM ON, AND CALL LIGHT WITHIN EASY REACH. WILL 
CONTINUE TO MONITOR PATIENT.

## 2020-11-06 NOTE — NUR
RN OPENING NOTES

Patient present at bed, sitting, A/O x4, denies pain or discomfort, on NC via 3L of O2, 
tolerating well, SPO2 is 98%, no SOB or resp distress noted, Sinus Rhytm on tele-monitor, 
multiple wound dressings noted, intact, L FA and R FA IV lines noted, R CW HD cath noted, 
all patent, intact. Safety measures in place, call light in reach, bed in lowest positon, 
bed is locked, bed alarm is on, will cont to monitor closely

## 2020-11-06 NOTE — NUR
RN NOTE

PT HAD 1 BOWEL MOVEMENT. JOHNATHON CARE RENDERED. PT REFUSED PARTIAL BED BATH AND LINEN CHANGE.

## 2020-11-06 NOTE — NUR
RN NOTE

NO ACUTE CHANGES OBSERVED OVERNIGHT. PT SLEEPING IN BED COMFORTABLY BUT EASILY AROUSABLE. ON 
3L OF O2 VIA NC. RESPIRATIONS EVEN AND UNLABORED. NO SIGNS OF PAIN OR DISCOMFORT. VITAL 
SIGNS STABLE VIA BEDSIDE MONITOR. PCI SITE (RIGHT FEMORAL) WITHOUT SIGNS OF BLEEDING OR 
COMPLICATIONS. ALL NEEDS MET AND ATTENDED TO. CALL LIGHT WITHIN REACH, SAFETY MEASURES IN 
PLACE, BED ALARM ON, BED IN LOWEST POSITION, SIDE RAILS X2 UP, WILL ENDORSE TO MORNING RN 
FOR SHAGGY. 

-------------------------------------------------------------------------------

Addendum: 11/06/20 at 0645 by LAINE ORTIZ RN

-------------------------------------------------------------------------------

OFFERED TO PLACE DVT PUMP BUT PT CONTINUES TO REFUSE DESPITE EXPLANATION OF RISKS VS 
ADVANTAGES.

## 2020-11-06 NOTE — NUR
MS/RN OPENING NOTE 



Patient awake in bed, S/P HD 1000 ml output. A/O x4. /67 HR 87. Breathing, even, 
unlabored. Skin warm, pink, dry appropriate for ethnicity. L foot amputation noted. Gangrene 
noted on right foor and right hand, dressing dry and intact. IV site LFA 20 SL. IV site RFA 
20g SL. IV sites patent, intact, no redness, or infiltration. HD cath noted on RCW. Patient 
is on cardiac diet, tolerating well. BS active. Patient is anuric. Bed in low position, 
wheels locked, side rails up x2, call light within reach.

## 2020-11-06 NOTE — NUR
RN NOTE

PT AWAKE AND ALERT IN BED. WITH SMALL BOWEL MOVEMENT (SMEAR) OFFERED PT BED BATH BUT PT 
REFUSED. JOHNATHON CARE RENDERED. COMPLETE LINEN AND GOWN CHANGE DONE.

## 2020-11-06 NOTE — NUR
RN NOTE

BLOOD SUGAR RECHECKED 155. PT REFUSING INSULIN AT THIS TIME DESPITE EXPLANATION OF RISKS VS 
ADVANTAGES.

## 2020-11-06 NOTE — NUR
RN NOTES



PATIENT IN BED RESTING COMFORTABLY, HEMODIALYSIS RN AT BEDSIDE. PATIENT'S ALERT AND ORIENTED 
X 4. ON NASAL CANNULA 3 LITERS WITH NO RESPIRATORY DISTRESS PRESENT AT THIS TIME WITH EVEN 
NON-LABORED BREATHING, AND NO SOB NOTED. IV ACCESS INTACT AND PATENT. PATIENT PRESENTS WITH 
NO PAIN OR DISCOMFORT AT THIS TIME. SKIN KEPT CLEAN AND DRY, DRESSINGS INTACT AND NO LEAKAGE 
PRESENT. PATIENT PRESENTS WITH NO PAIN OR DISCOMFORT AT THIS TIME. MET ALL OF PATIENT'S 
NEEDS. SAFETY PRECAUTIONS IMPLEMENTED WITH BED LOCKED BED IN THE LOWEST POSITION, BILATERAL 
SIDE RAILS UP, BED ALARM ON, AND CALL LIGHT WITHIN EASY REACH OF THE PATIENT. WILL ENDORSE 
PLAN OF CARE TO UPCOMING RN.

## 2020-11-06 NOTE — NUR
Transfer patient to [place] [id number], [facility] bed, O2 tank in place, tolerating well, report given 
to AUGUST Mendez

## 2020-11-07 VITALS — SYSTOLIC BLOOD PRESSURE: 145 MMHG | DIASTOLIC BLOOD PRESSURE: 75 MMHG

## 2020-11-07 VITALS — DIASTOLIC BLOOD PRESSURE: 67 MMHG | SYSTOLIC BLOOD PRESSURE: 134 MMHG

## 2020-11-07 VITALS — SYSTOLIC BLOOD PRESSURE: 123 MMHG | DIASTOLIC BLOOD PRESSURE: 55 MMHG

## 2020-11-07 LAB
BASOPHILS # BLD AUTO: 0.1 /CMM (ref 0–0.2)
BASOPHILS NFR BLD AUTO: 1 % (ref 0–2)
BUN SERPL-MCNC: 49 MG/DL (ref 7–18)
CALCIUM SERPL-MCNC: 8.5 MG/DL (ref 8.5–10.1)
CHLORIDE SERPL-SCNC: 99 MMOL/L (ref 98–107)
CO2 SERPL-SCNC: 24 MMOL/L (ref 21–32)
CREAT SERPL-MCNC: 4.3 MG/DL (ref 0.6–1.3)
EOSINOPHIL NFR BLD AUTO: 1.5 % (ref 0–6)
GLUCOSE SERPL-MCNC: 125 MG/DL (ref 74–106)
HCT VFR BLD AUTO: 26 % (ref 39–51)
HGB BLD-MCNC: 7.9 G/DL (ref 13.5–17.5)
LYMPHOCYTES NFR BLD AUTO: 1.1 /CMM (ref 0.8–4.8)
LYMPHOCYTES NFR BLD AUTO: 8.2 % (ref 20–44)
MCHC RBC AUTO-ENTMCNC: 31 G/DL (ref 31–36)
MCV RBC AUTO: 89 FL (ref 80–96)
MONOCYTES NFR BLD AUTO: 0.9 /CMM (ref 0.1–1.3)
MONOCYTES NFR BLD AUTO: 6.3 % (ref 2–12)
NEUTROPHILS # BLD AUTO: 11.1 /CMM (ref 1.8–8.9)
NEUTROPHILS NFR BLD AUTO: 83 % (ref 43–81)
PLATELET # BLD AUTO: 521 /CMM (ref 150–450)
POTASSIUM SERPL-SCNC: 4.1 MMOL/L (ref 3.5–5.1)
RBC # BLD AUTO: 2.9 MIL/UL (ref 4.5–6)
SODIUM SERPL-SCNC: 138 MMOL/L (ref 136–145)
WBC NRBC COR # BLD AUTO: 13.4 K/UL (ref 4.3–11)

## 2020-11-07 RX ADMIN — ASPIRIN 81 MG SCH MG: 81 TABLET ORAL at 09:38

## 2020-11-07 RX ADMIN — Medication SCH TAB: at 09:39

## 2020-11-07 RX ADMIN — CALCIUM CARBONATE (ANTACID) CHEW TAB 500 MG SCH MG: 500 CHEW TAB at 17:00

## 2020-11-07 RX ADMIN — FERROUS SULFATE TAB 325 MG (65 MG ELEMENTAL FE) SCH MG: 325 (65 FE) TAB at 09:00

## 2020-11-07 RX ADMIN — CLONIDINE HYDROCHLORIDE SCH MG: 0.1 TABLET ORAL at 10:46

## 2020-11-07 RX ADMIN — Medication SCH TAB: at 09:00

## 2020-11-07 RX ADMIN — CLOPIDOGREL BISULFATE SCH MG: 75 TABLET, FILM COATED ORAL at 09:00

## 2020-11-07 RX ADMIN — CALCIUM CARBONATE (ANTACID) CHEW TAB 500 MG SCH MG: 500 CHEW TAB at 14:36

## 2020-11-07 RX ADMIN — LABETALOL HCL SCH MG: 100 TABLET, FILM COATED ORAL at 10:43

## 2020-11-07 RX ADMIN — CLOPIDOGREL BISULFATE SCH MG: 75 TABLET, FILM COATED ORAL at 09:39

## 2020-11-07 RX ADMIN — PANTOPRAZOLE SODIUM SCH MG: 40 TABLET, DELAYED RELEASE ORAL at 09:38

## 2020-11-07 RX ADMIN — INSULIN HUMAN PRN UNIT: 100 INJECTION, SOLUTION PARENTERAL at 21:13

## 2020-11-07 RX ADMIN — INSULIN HUMAN PRN UNIT: 100 INJECTION, SOLUTION PARENTERAL at 06:25

## 2020-11-07 RX ADMIN — PANTOPRAZOLE SODIUM SCH MG: 40 TABLET, DELAYED RELEASE ORAL at 09:39

## 2020-11-07 RX ADMIN — Medication SCH EACH: at 06:37

## 2020-11-07 RX ADMIN — CALCIUM CARBONATE (ANTACID) CHEW TAB 500 MG SCH MG: 500 CHEW TAB at 09:38

## 2020-11-07 RX ADMIN — OXYCODONE HYDROCHLORIDE AND ACETAMINOPHEN SCH MG: 500 TABLET ORAL at 09:00

## 2020-11-07 RX ADMIN — AMLODIPINE BESYLATE SCH MG: 10 TABLET ORAL at 09:40

## 2020-11-07 RX ADMIN — Medication SCH EACH: at 12:35

## 2020-11-07 RX ADMIN — CALCIUM CARBONATE (ANTACID) CHEW TAB 500 MG SCH MG: 500 CHEW TAB at 09:00

## 2020-11-07 RX ADMIN — ATORVASTATIN CALCIUM SCH MG: 40 TABLET, FILM COATED ORAL at 21:12

## 2020-11-07 RX ADMIN — AMLODIPINE BESYLATE SCH MG: 10 TABLET ORAL at 09:00

## 2020-11-07 RX ADMIN — ASPIRIN 81 MG SCH MG: 81 TABLET ORAL at 09:00

## 2020-11-07 RX ADMIN — OXYCODONE HYDROCHLORIDE AND ACETAMINOPHEN SCH MG: 500 TABLET ORAL at 09:40

## 2020-11-07 RX ADMIN — CLONIDINE HYDROCHLORIDE SCH MG: 0.1 TABLET ORAL at 10:43

## 2020-11-07 RX ADMIN — LABETALOL HCL SCH MG: 100 TABLET, FILM COATED ORAL at 17:00

## 2020-11-07 RX ADMIN — Medication SCH EACH: at 17:38

## 2020-11-07 RX ADMIN — Medication SCH EACH: at 21:12

## 2020-11-07 RX ADMIN — FERROUS SULFATE TAB 325 MG (65 MG ELEMENTAL FE) SCH MG: 325 (65 FE) TAB at 09:39

## 2020-11-07 NOTE — NUR
RN OPENING NOTES



Received patient awake, sitting on edge of bed. No complaints made at this time. Discussed 
to patient POC, pt verbalized understanding. On fall precautions, will continue to monitor 
accordingly.

## 2020-11-07 NOTE — NUR
MS/RN CLOSING NOTE 



Patient awake in bed, A/O x4. Breathing, even, unlabored. Skin warm, pink, dry appropriate 
for ethnicity. L foot amputation noted. Gangrene noted on right foot and right hand, 
dressing changed, dry and intact. IV site LFA 20 SL. IV site RFA 20g SL. IV sites patent, 
intact, no redness, or infiltration. HD cath noted on RCW. Patient is on cardiac diet, 
tolerating well. BS active. No bowel movement. Patient is anuric. Bed in low position, 
wheels locked, side rails up x2, call light within reach.

## 2020-11-07 NOTE — NUR
RN NOTES



R foot wound dressing done as ordered. Pt tolerated well. Encourage patient to adhere into 
diabetic regimen. Blood sugar checke, 163. Pt refused insulin coverage at this time. Per 
patient he is not eating anything tonight. Instructed patient to call the nurse if he is 
feeling anything unusual, dizziness, cold, clammy skin or hypoglycemia, pt verbalized 
understanding. Will continue to monitor accordingly.

## 2020-11-08 VITALS — SYSTOLIC BLOOD PRESSURE: 156 MMHG | DIASTOLIC BLOOD PRESSURE: 92 MMHG

## 2020-11-08 VITALS — SYSTOLIC BLOOD PRESSURE: 163 MMHG | DIASTOLIC BLOOD PRESSURE: 82 MMHG

## 2020-11-08 RX ADMIN — ACETAMINOPHEN PRN MG: 325 TABLET ORAL at 05:25

## 2020-11-08 RX ADMIN — AMLODIPINE BESYLATE SCH MG: 10 TABLET ORAL at 09:00

## 2020-11-08 RX ADMIN — FERROUS SULFATE TAB 325 MG (65 MG ELEMENTAL FE) SCH MG: 325 (65 FE) TAB at 09:00

## 2020-11-08 RX ADMIN — LABETALOL HCL SCH MG: 100 TABLET, FILM COATED ORAL at 09:00

## 2020-11-08 RX ADMIN — CALCIUM CARBONATE (ANTACID) CHEW TAB 500 MG SCH MG: 500 CHEW TAB at 13:00

## 2020-11-08 RX ADMIN — Medication SCH EACH: at 12:00

## 2020-11-08 RX ADMIN — ASPIRIN 81 MG SCH MG: 81 TABLET ORAL at 09:00

## 2020-11-08 RX ADMIN — CLOPIDOGREL BISULFATE SCH MG: 75 TABLET, FILM COATED ORAL at 09:00

## 2020-11-08 RX ADMIN — CALCIUM CARBONATE (ANTACID) CHEW TAB 500 MG SCH MG: 500 CHEW TAB at 09:00

## 2020-11-08 RX ADMIN — OXYCODONE HYDROCHLORIDE AND ACETAMINOPHEN SCH MG: 500 TABLET ORAL at 09:00

## 2020-11-08 RX ADMIN — INSULIN HUMAN PRN UNIT: 100 INJECTION, SOLUTION PARENTERAL at 12:02

## 2020-11-08 RX ADMIN — Medication SCH EACH: at 08:37

## 2020-11-08 RX ADMIN — Medication SCH TAB: at 09:00

## 2020-11-08 RX ADMIN — CLONIDINE HYDROCHLORIDE SCH MG: 0.1 TABLET ORAL at 09:00

## 2020-11-08 NOTE — NUR
RN   NOTE



THE PATIENT IS RECEIVED IN BED. PATIENT IS ALERT AND ORIENTED X4. RECEIVING OXYGEN AT 3L/MIN 
VIA NASAL CANNULA AND DENIES SOB. RESPIRATION REGULAR AND UNLABORED. DENIES PAIN. LFA G 20 
PATENT AND SALINE LOCKED. RIGHT CHEST WALL HD CATH PRESENT. BED LOW AND LOCKED. SIDE RAILS 
UP X3. CALL LIGHT WITHIN REACH. WILL CONTINUE TO MONITOR.

## 2020-11-08 NOTE — NUR
RN  NOTE



THE PATIENT REFUSED DIALYSIS DESPITE EXPLAINING HIM THE RISKS AND BENEFITS. DR BANKS 
IS MADE AWARE.

## 2020-11-08 NOTE — NUR
RN  NOTE



PATIENT COMPLAINS OF RIGHT SHOULDER PAIN 6/10. DR DON IS MADE AWARE AND NEW ORDER OF 
[facility] 5/325 1 TAB PO Q6HR PRN IS RECEIVED. THE ORDER IS READ BACK, VERIFIED. NOTED AND 
CARRIED OUT.

## 2020-11-08 NOTE — NUR
RN CLOSING NOTES



Pt asleep on bed, no new complaints made. Preferred to check blood sugar just before eating. 
All nursing needs attended. Due meds given as ordered. Endorsed.

## 2020-11-08 NOTE — NUR
RN  NOTE



THE PATIENT ALERT AND ORIENTED X4. DENIES PAIN. IN ROOM AIR AND SATURATION  IS AT 97%. 
RESPIRATION REGULAR AND UNLABORED. THE PATIENT IN NO APPARENT DISTRESS. DISCHARGE EDUCATION 
PROVIDED TO THE PATIENT AND SHE VERBALIZED UNDERSTANDING. THE PATIENT LEFT THE HOSPITAL IN 
STABLE CONDITION. 

-------------------------------------------------------------------------------

Addendum: 11/08/20 at 1149 by STACY DEL TOOR RN

-------------------------------------------------------------------------------

RN  NOTE



WRONG NOTE

## 2020-11-08 NOTE — NUR
RN  NOTE



THE PATIENT REFUSED ALL MORNING MEDICATIONS DESPITE EXPLAINING HIM RISKS AND BENEFITS 
MULTIPLE TIMES. DR DON IS MADE AWARE.

## 2020-11-08 NOTE — NUR
RN  NOTE



THE PATIENT`S BLOOD SUGAR . THE PATIENT REFUSES INSULIN DESPITE EXPLAINING RISKS AND 
BENEFITS.

## 2020-11-08 NOTE — NUR
RN MS DISCHARGE NOTE



PT WAS DISCHARGED IN STABLE CONDITION. DISCHARGE MEDICATIONS AND EDUCATION PROVIDED TO PT, 
PT VERBALIZED UNDERSTANDING. IV TO RT FA REMOVED. NO FC IN PLACE. PT D/C TO [facility]. TRANSPORTED VIA GURNEY, ACCOMPANIED BY 2 AMBULANCE PERSONNEL. PT 
DISCHARGED IN STABLE CONDITION.

## 2021-11-22 ENCOUNTER — HOSPITAL ENCOUNTER (INPATIENT)
Dept: HOSPITAL 54 - ER | Age: 51
LOS: 2 days | DRG: 720 | End: 2021-11-24
Attending: INTERNAL MEDICINE | Admitting: NURSE PRACTITIONER
Payer: MEDICAID

## 2021-11-22 VITALS — WEIGHT: 99.37 LBS | BODY MASS INDEX: 18.76 KG/M2 | HEIGHT: 61 IN

## 2021-11-22 VITALS — SYSTOLIC BLOOD PRESSURE: 106 MMHG | DIASTOLIC BLOOD PRESSURE: 46 MMHG

## 2021-11-22 DIAGNOSIS — D64.9: ICD-10-CM

## 2021-11-22 DIAGNOSIS — K21.9: ICD-10-CM

## 2021-11-22 DIAGNOSIS — J96.01: ICD-10-CM

## 2021-11-22 DIAGNOSIS — E11.52: ICD-10-CM

## 2021-11-22 DIAGNOSIS — A41.9: Primary | ICD-10-CM

## 2021-11-22 DIAGNOSIS — J98.11: ICD-10-CM

## 2021-11-22 DIAGNOSIS — I87.313: ICD-10-CM

## 2021-11-22 DIAGNOSIS — E87.5: ICD-10-CM

## 2021-11-22 DIAGNOSIS — E88.09: ICD-10-CM

## 2021-11-22 DIAGNOSIS — E43: ICD-10-CM

## 2021-11-22 DIAGNOSIS — E80.6: ICD-10-CM

## 2021-11-22 DIAGNOSIS — R64: ICD-10-CM

## 2021-11-22 DIAGNOSIS — S31.30XA: ICD-10-CM

## 2021-11-22 DIAGNOSIS — I50.23: ICD-10-CM

## 2021-11-22 DIAGNOSIS — I49.01: ICD-10-CM

## 2021-11-22 DIAGNOSIS — J90: ICD-10-CM

## 2021-11-22 DIAGNOSIS — L97.425: ICD-10-CM

## 2021-11-22 DIAGNOSIS — N18.6: ICD-10-CM

## 2021-11-22 DIAGNOSIS — Z98.61: ICD-10-CM
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
## 2021-11-22 DIAGNOSIS — E78.5: ICD-10-CM
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
## 2021-11-22 DIAGNOSIS — J18.9: ICD-10-CM

## 2021-11-22 DIAGNOSIS — Z99.2: ICD-10-CM

## 2021-11-22 DIAGNOSIS — Z87.891: ICD-10-CM

## 2021-11-22 DIAGNOSIS — I13.2: ICD-10-CM

## 2021-11-22 DIAGNOSIS — E87.1: ICD-10-CM

## 2021-11-22 DIAGNOSIS — M89.9: ICD-10-CM

## 2021-11-22 DIAGNOSIS — Z20.822: ICD-10-CM

## 2021-11-22 DIAGNOSIS — D75.839: ICD-10-CM

## 2021-11-22 DIAGNOSIS — E11.22: ICD-10-CM

## 2021-11-22 DIAGNOSIS — L97.419: ICD-10-CM

## 2021-11-22 DIAGNOSIS — I46.9: ICD-10-CM

## 2021-11-22 DIAGNOSIS — Z89.432: ICD-10-CM

## 2021-11-22 DIAGNOSIS — I25.10: ICD-10-CM

## 2021-11-22 LAB
ALBUMIN SERPL BCP-MCNC: 2.2 G/DL (ref 3.4–5)
ALP SERPL-CCNC: 349 U/L (ref 46–116)
ALT SERPL W P-5'-P-CCNC: 14 U/L (ref 12–78)
AST SERPL W P-5'-P-CCNC: 11 U/L (ref 15–37)
BASE EXCESS BLDA CALC-SCNC: 0.9 MMOL/L
BASOPHILS # BLD AUTO: 0 K/UL (ref 0–0.2)
BASOPHILS NFR BLD AUTO: 0.3 % (ref 0–2)
BILIRUB DIRECT SERPL-MCNC: 1.1 MG/DL (ref 0–0.2)
BILIRUB SERPL-MCNC: 1.6 MG/DL (ref 0.2–1)
BUN SERPL-MCNC: 37 MG/DL (ref 7–18)
CALCIUM SERPL-MCNC: 7.8 MG/DL (ref 8.5–10.1)
CHLORIDE SERPL-SCNC: 100 MMOL/L (ref 98–107)
CO2 SERPL-SCNC: 25 MMOL/L (ref 21–32)
CREAT SERPL-MCNC: 3.3 MG/DL (ref 0.6–1.3)
EOSINOPHIL NFR BLD AUTO: 0 % (ref 0–6)
GLUCOSE SERPL-MCNC: 129 MG/DL (ref 74–106)
HCT VFR BLD AUTO: 35 % (ref 39–51)
HGB BLD-MCNC: 10.4 G/DL (ref 13.5–17.5)
INHALED O2 CONCENTRATION: 28 %
LIPASE SERPL-CCNC: 38 U/L (ref 73–393)
LYMPHOCYTES NFR BLD AUTO: 0.3 K/UL (ref 0.8–4.8)
LYMPHOCYTES NFR BLD AUTO: 1.7 % (ref 20–44)
MCHC RBC AUTO-ENTMCNC: 30 G/DL (ref 31–36)
MCV RBC AUTO: 88 FL (ref 80–96)
MONOCYTES NFR BLD AUTO: 0.4 K/UL (ref 0.1–1.3)
MONOCYTES NFR BLD AUTO: 2.7 % (ref 2–12)
NEUTROPHILS # BLD AUTO: 15.9 K/UL (ref 1.8–8.9)
NEUTROPHILS NFR BLD AUTO: 95.3 % (ref 43–81)
PCO2 TEMP ADJ BLDA: 37.7 MMHG (ref 35–45)
PH TEMP ADJ BLDA: 7.44 [PH] (ref 7.35–7.45)
PLATELET # BLD AUTO: 346 K/UL (ref 150–450)
PO2 TEMP ADJ BLDA: 81.4 MMHG (ref 75–100)
POTASSIUM SERPL-SCNC: 4.2 MMOL/L (ref 3.5–5.1)
PROT SERPL-MCNC: 7.8 G/DL (ref 6.4–8.2)
RBC # BLD AUTO: 3.99 MIL/UL (ref 4.5–6)
SODIUM SERPL-SCNC: 135 MMOL/L (ref 136–145)
WBC NRBC COR # BLD AUTO: 16.6 K/UL (ref 4.3–11)

## 2021-11-22 PROCEDURE — C9803 HOPD COVID-19 SPEC COLLECT: HCPCS

## 2021-11-22 PROCEDURE — A6253 ABSORPT DRG > 48 SQ IN W/O B: HCPCS

## 2021-11-22 PROCEDURE — G0378 HOSPITAL OBSERVATION PER HR: HCPCS

## 2021-11-22 PROCEDURE — U0003 INFECTIOUS AGENT DETECTION BY NUCLEIC ACID (DNA OR RNA); SEVERE ACUTE RESPIRATORY SYNDROME CORONAVIRUS 2 (SARS-COV-2) (CORONAVIRUS DISEASE [COVID-19]), AMPLIFIED PROBE TECHNIQUE, MAKING USE OF HIGH THROUGHPUT TECHNOLOGIES AS DESCRIBED BY CMS-2020-01-R: HCPCS

## 2021-11-22 PROCEDURE — A6403 STERILE GAUZE>16 <= 48 SQ IN: HCPCS

## 2021-11-22 NOTE — NUR
AAOX3, [id number], from B/C c/o sob 60% on 2lpm, 100% on 4lpm via NC, BS 54 D10 
infused, last HD saturday. Resp is even and unlabored with no apparent distress 
noted. Skin is warm and non diaphoretic. Placed on cardiac monitor. Awaiting md 
for eval.

## 2021-11-23 VITALS — DIASTOLIC BLOOD PRESSURE: 50 MMHG | SYSTOLIC BLOOD PRESSURE: 195 MMHG

## 2021-11-23 LAB
ALBUMIN SERPL BCP-MCNC: 2.3 G/DL (ref 3.4–5)
ALP SERPL-CCNC: 360 U/L (ref 46–116)
ALT SERPL W P-5'-P-CCNC: 19 U/L (ref 12–78)
AST SERPL W P-5'-P-CCNC: 12 U/L (ref 15–37)
BASOPHILS # BLD AUTO: 0.1 K/UL (ref 0–0.2)
BASOPHILS NFR BLD AUTO: 0.6 % (ref 0–2)
BILIRUB SERPL-MCNC: 1.6 MG/DL (ref 0.2–1)
BUN SERPL-MCNC: 45 MG/DL (ref 7–18)
CALCIUM SERPL-MCNC: 7.8 MG/DL (ref 8.5–10.1)
CHLORIDE SERPL-SCNC: 99 MMOL/L (ref 98–107)
CHOLEST SERPL-MCNC: 105 MG/DL (ref ?–200)
CO2 SERPL-SCNC: 25 MMOL/L (ref 21–32)
CREAT SERPL-MCNC: 3.5 MG/DL (ref 0.6–1.3)
EOSINOPHIL NFR BLD AUTO: 0 % (ref 0–6)
GLUCOSE SERPL-MCNC: 65 MG/DL (ref 74–106)
HCT VFR BLD AUTO: 36 % (ref 39–51)
HDLC SERPL-MCNC: 28 MG/DL (ref 40–60)
HGB BLD-MCNC: 10.7 G/DL (ref 13.5–17.5)
LDLC SERPL DIRECT ASSAY-MCNC: 63 MG/DL (ref 0–99)
LYMPHOCYTES NFR BLD AUTO: 0.8 K/UL (ref 0.8–4.8)
LYMPHOCYTES NFR BLD AUTO: 7 % (ref 20–44)
MAGNESIUM SERPL-MCNC: 2.3 MG/DL (ref 1.8–2.4)
MCHC RBC AUTO-ENTMCNC: 30 G/DL (ref 31–36)
MCV RBC AUTO: 86 FL (ref 80–96)
MONOCYTES NFR BLD AUTO: 0.5 K/UL (ref 0.1–1.3)
MONOCYTES NFR BLD AUTO: 4.4 % (ref 2–12)
NEUTROPHILS # BLD AUTO: 9.5 K/UL (ref 1.8–8.9)
NEUTROPHILS NFR BLD AUTO: 88 % (ref 43–81)
PHOSPHATE SERPL-MCNC: 6.5 MG/DL (ref 2.5–4.9)
PLATELET # BLD AUTO: 313 K/UL (ref 150–450)
POTASSIUM SERPL-SCNC: 4.6 MMOL/L (ref 3.5–5.1)
PROT SERPL-MCNC: 7.8 G/DL (ref 6.4–8.2)
RBC # BLD AUTO: 4.13 MIL/UL (ref 4.5–6)
SODIUM SERPL-SCNC: 136 MMOL/L (ref 136–145)
TRIGL SERPL-MCNC: 66 MG/DL (ref 30–150)
WBC NRBC COR # BLD AUTO: 10.8 K/UL (ref 4.3–11)

## 2021-11-23 PROCEDURE — 5A1D70Z PERFORMANCE OF URINARY FILTRATION, INTERMITTENT, LESS THAN 6 HOURS PER DAY: ICD-10-PCS | Performed by: INTERNAL MEDICINE

## 2021-11-23 RX ADMIN — FAMOTIDINE SCH MG: 20 TABLET, FILM COATED ORAL at 09:10

## 2021-11-23 RX ADMIN — DEXTROSE MONOHYDRATE PRN ML: 500 INJECTION PARENTERAL at 09:35

## 2021-11-23 RX ADMIN — Medication SCH EACH: at 16:58

## 2021-11-23 RX ADMIN — Medication SCH GM: at 23:40

## 2021-11-23 RX ADMIN — Medication SCH EACH: at 11:39

## 2021-11-23 RX ADMIN — Medication SCH EACH: at 22:29

## 2021-11-23 RX ADMIN — Medication SCH MG: at 19:30

## 2021-11-23 RX ADMIN — Medication SCH MG: at 13:30

## 2021-11-23 RX ADMIN — Medication SCH EACH: at 08:58

## 2021-11-23 RX ADMIN — DEXTROSE MONOHYDRATE PRN ML: 500 INJECTION PARENTERAL at 08:58

## 2021-11-23 RX ADMIN — DEXTROSE MONOHYDRATE PRN ML: 500 INJECTION PARENTERAL at 21:40

## 2021-11-23 NOTE — NUR
RN NOTES, 



PATIENT CONT ON  2LPM VIA NC WITH O2 SAT > 97%, PATIENT NSR IN THE TELE MONITOR WITH HR 80S 
AT THIS TIME,  AFEBRILE, NO SIGNIFICANT CHANGE IN CONDITION, WITH EPISODE OF HYPOGLYCEMIA 
UPON ADMISSION 65MG/DL, JUICE AND FOOD PROVIDED, AFTER THAT BLOOD SUGAR 132MG/DL, PATIENT 
A/O X2 BOWEL MOVEMENTS, WOUND CONSULTATION IN AM FOR WOUNDS TREATMENT, S/R OF BED UPX2, CALL 
LIGHT W/I REACH, WILL ENDORSE CONTINUITY OF CARE TO ONCOMING NURSE.

## 2021-11-23 NOTE — NUR
RN NOTES, 

PATIENT RECEIVING HEMODIALYSIS AT THIS TIME,  TOLERATED WELL, NO S/S OF SOB/DISTRESS NOTED,  
WILL CONTINUE TO MONITOR CLOSELY.

## 2021-11-23 NOTE — NUR
RN NOTES,

BLOOD SUGAR LEVEL AT TIME 177MG/DL,  AFTER  OF DEXTROSE ADMINISTERED PER PROTOCOL, PATIENT 
REFUSED TO EAT, BUT ENCOURAGED TO EAT AS TOLERATED.

## 2021-11-23 NOTE — NUR
RN ADMISSION NOTES,

AT 2345 RECEIVED 51 Y O MALE ADMITTED FROM ER DEPARTMENT VIA STRETCHER ACCOMPANIED BY 2 
NURSES, UND[facility], WITH ADMITTING DX PNA, SECONDARY DX 
ESRD, PATIENT A/O X3 ABLE TO VERBALIZE NEEDS AND CONCERNS, NO SOB/ACUTE DISTRESS NOTED, ON 
2LPM VIA NC WITH O2 SAT 97%, 106/46, 88, 18, 98.3,  PATIENT NOTED WITH IV IN LEFT FGA 20G, 
AND RIGHT CHEST WALL HD CATH IN PLACED, PATENT AND INTACT, NOTED WITH BOTH FEET AMPUTATION 
AND WOUNDS, SACRA/PERINEAL/SCROTUM EXCORIATION/REDNESS,  RIGHT HAND NECROTIC FINGERS, WOUND 
CONSULT ORDER PLACED, AFEBRILE, BED BATH GIVEN, TELE MONITOR ATTACHED AND SHOWED NSR WITH HR 
80S AT THIS TIME, S/R OF BED UPX2, CALL LIGHT W/I REACH, WILL CONTINUE TO MONITOR CLOSELY.

## 2021-11-23 NOTE — NUR
RN OPENING NOTE



RECEIVED PATIENT IN BED. A/O X3. ON 02 AT 4 LPM VIA NC. SOB NOTED. NO S/S OF RESPIRATORY 
DISTRESS. DENIES ANY PAIN AT THIS TIME. TELE READING SHOWS SR 80's. R IJ HD CATH C/D/I. IV 
ACCESS ON L FA # 20 G, INTACT AND PATENT. ABLE TO MAKE NEEDS KNOWN. SAFETY MEASURES 
MAINTAINED. BED IN LOWEST POSITION, BRAKES LOCKED. SIDE RAILS UP X2. CALL LIGHT WITHIN 
REACH. WILL CONTINUE PLAN OF CARE.

## 2021-11-23 NOTE — NUR
RN CLOSING NOTE



PATIENT RESTING IN BED. A/O X3. ON 02 AT 4 LPM VIA NC. DENIES SOB. NO S/S OF RESPIRATORY 
DISTRESS. NO REPORTS OF PAIN AT THIS TIME. TELE READING SHOWS SR 70-80's. R IJ HD CATH 
C/D/I. IV ACCESS ON L FA # 20 G, INTACT AND PATENT. DUE MEDS GIVEN AS ORDERED. ALL NEEDS 
HAVE BEEN MET AND ATTENDED. WOUND TREATMENT AS ORDERED. SAFETY MEASURES MAINTAINED. BED IN 
LOWEST POSITION, BRAKES LOCKED. SIDE RAILS UP X2. KEPT CALL LIGHT WITHIN REACH. WILL ENDORSE 
CONTINUITY OF CARE TO ONCOMING SHIFT.

## 2021-11-23 NOTE — NUR
RN NOTE



PATIENT'S BLOOD SUGAR IS 41. RECHECKED AND RESULT WAS 43. ORANGE JUICE AND D50 IV WAS GIVEN. 
WILL CONTINUE TO MONITOR.

## 2021-11-23 NOTE — NUR
WOUND CARE CONSULT: REVIEWED CHART, NURSING DOCUMENTATION AND PHOTOS WHICH INDICATE SACRAL 
SCARRING AND MULTIPLE WOUNDS, PRESENT ON ADMISSION. DR HOLLIS AND DR RAMOS TO BE 
CALLED THIS AM FOR SURGICAL AND DPM CONSULTS. RECOMMENDATIONS MADE FOR SKIN PROTECTION. 
DISCUSSED WITH NURSING STAFF. FIRST STEP LOW AIRLOSS MATTRESS IS ON ORDER. MD IN AGREEMENT 
WITH PLAN OF CARE.

## 2021-11-23 NOTE — NUR
RN ADMISSION NOTES,

AT 2345 RECEIVED 51 Y O MALE ADMITTED FROM ER DEPARTMENT VIA STRETCHER ACCOMPANIED BY 2 
NURSES, UND[facility], WITH ADMITTING DX PNA, SECONDARY DX 
ESRD, PATIENT A/O X3 ABLE TO VERBALIZE NEEDS AND CONCERNS, NO SOB/ACUTE DISTRESS NOTED, ON 
2LPM VIA NC WITH O2 SAT 97%, 106/46, 88, 18, 98.3,  PATIENT NOTED WITH IV IN LEFT FGA 20G, 
AND RIGHT CHEST WALL HD CATH IN PLACED, PATENT AND INTACT, NOTED WITH BOTH FEET AMPUTATION 
AND WOUNDS, SACRA/PERINEAL/SCROTUM EXCORIATION/REDNESS,  RIGHT HAND NECROTIC FINGERS, WOUND 
CONSULT ORDER PLACED, AFEBRILE, BED BATH GIVEN, TELE MONITOR ATTACHED AND SHOWED NSR WITH HR 
80S AT THIS TIME, S/R OF BED UPX2, CALL LIGHT W/I REACH, WILL CONTINUE TO MONITOR CLOSELY.

-------------------------------------------------------------------------------

Addendum: 11/24/21 at 0411 by MANE BALLARD RN

-------------------------------------------------------------------------------

DOCUMENTED UNDER WRONG NURSE NAME

## 2021-11-23 NOTE — NUR
RN NOTE



       RECEIVED CALL FROM LAB,CRITICAL LAB VALUE PROCALCITONIN 7.13 REPORTED.  DR. FIGUEREDO MADE AWARE, NO NEW ORDERS GIVEN AT THIS TIME.

## 2021-11-23 NOTE — NUR
RN NOTES,

NOTED PATIENT WITH BLOOD SUGAR LEVEL 55MG/DL, PATIENT A/O BY SYMPTOMATIC, HE ASKED TO CHECK  
 BLOOD SUGAR,  PATIENT HAVING HD AT THIS TIME, HD NURSE INFORMED DR ANNE ENGLISH AND HE 
ORDERED TO STOP HD, WILL ADMINISTER DEXTROSE PER PROTOCOL,  /72, 91, 24, AFEBRILE, 
WILL CONTINUE TO MONITOR CLOSELY, AMOUNT OF FLUID REMOVED 2,432ML.

## 2021-11-23 NOTE — NUR
RN NOTES,

PATIENT CALLED HE STATED HE WAS HAVING  RESPIRATORY DISTRESS, PATIENT PALE, COLD AND CLAMMY, 
AND DIAPHORETIC, PLACED HIM IN NRM AT 15LPM O2 SAT LEVEL SHOWING 60-70%, BUT PATIENT VERY 
COLD UNABLE TO GET AN ACCURATE READING, BP 97/47, 50-60S, RT/CHARGE NURSE AT BEDSIDE.

## 2021-11-23 NOTE — NUR
RN NOTES,



CALLED DR KYAW BAIRD TO INFORM PATIENT'S CONDITION, WHILE INFORMING DOCTOR, PATIENT 
BECAME UNRESPONSIVE, CODE BLUE STARTED ,  DID NOT GIVE FURTHER ORDERS BUT TO RUN THE CODE, 
CODE STARTED AT 0000, PLEASE REFER TO CODE BLUE SHEET FOR FURTHER DETAILS.

## 2021-11-24 VITALS — DIASTOLIC BLOOD PRESSURE: 69 MMHG | SYSTOLIC BLOOD PRESSURE: 114 MMHG

## 2021-11-24 VITALS — SYSTOLIC BLOOD PRESSURE: 108 MMHG | DIASTOLIC BLOOD PRESSURE: 59 MMHG

## 2021-11-24 VITALS — SYSTOLIC BLOOD PRESSURE: 100 MMHG | DIASTOLIC BLOOD PRESSURE: 59 MMHG

## 2021-11-24 VITALS — DIASTOLIC BLOOD PRESSURE: 60 MMHG | SYSTOLIC BLOOD PRESSURE: 94 MMHG

## 2021-11-24 VITALS — DIASTOLIC BLOOD PRESSURE: 54 MMHG | SYSTOLIC BLOOD PRESSURE: 157 MMHG

## 2021-11-24 VITALS — SYSTOLIC BLOOD PRESSURE: 78 MMHG | DIASTOLIC BLOOD PRESSURE: 53 MMHG

## 2021-11-24 VITALS — SYSTOLIC BLOOD PRESSURE: 99 MMHG | DIASTOLIC BLOOD PRESSURE: 60 MMHG

## 2021-11-24 VITALS — DIASTOLIC BLOOD PRESSURE: 34 MMHG | SYSTOLIC BLOOD PRESSURE: 111 MMHG

## 2021-11-24 VITALS — DIASTOLIC BLOOD PRESSURE: 36 MMHG | SYSTOLIC BLOOD PRESSURE: 106 MMHG

## 2021-11-24 VITALS — SYSTOLIC BLOOD PRESSURE: 113 MMHG | DIASTOLIC BLOOD PRESSURE: 50 MMHG

## 2021-11-24 VITALS — SYSTOLIC BLOOD PRESSURE: 110 MMHG | DIASTOLIC BLOOD PRESSURE: 28 MMHG

## 2021-11-24 VITALS — SYSTOLIC BLOOD PRESSURE: 148 MMHG | DIASTOLIC BLOOD PRESSURE: 43 MMHG

## 2021-11-24 VITALS — DIASTOLIC BLOOD PRESSURE: 35 MMHG | SYSTOLIC BLOOD PRESSURE: 139 MMHG

## 2021-11-24 VITALS — SYSTOLIC BLOOD PRESSURE: 106 MMHG | DIASTOLIC BLOOD PRESSURE: 29 MMHG

## 2021-11-24 VITALS — SYSTOLIC BLOOD PRESSURE: 129 MMHG | DIASTOLIC BLOOD PRESSURE: 41 MMHG

## 2021-11-24 VITALS — DIASTOLIC BLOOD PRESSURE: 68 MMHG | SYSTOLIC BLOOD PRESSURE: 142 MMHG

## 2021-11-24 VITALS — DIASTOLIC BLOOD PRESSURE: 83 MMHG | SYSTOLIC BLOOD PRESSURE: 112 MMHG

## 2021-11-24 VITALS — DIASTOLIC BLOOD PRESSURE: 16 MMHG | SYSTOLIC BLOOD PRESSURE: 39 MMHG

## 2021-11-24 VITALS — DIASTOLIC BLOOD PRESSURE: 65 MMHG | SYSTOLIC BLOOD PRESSURE: 118 MMHG

## 2021-11-24 VITALS — DIASTOLIC BLOOD PRESSURE: 70 MMHG | SYSTOLIC BLOOD PRESSURE: 120 MMHG

## 2021-11-24 VITALS — SYSTOLIC BLOOD PRESSURE: 63 MMHG | DIASTOLIC BLOOD PRESSURE: 44 MMHG

## 2021-11-24 VITALS — SYSTOLIC BLOOD PRESSURE: 70 MMHG | DIASTOLIC BLOOD PRESSURE: 38 MMHG

## 2021-11-24 VITALS — SYSTOLIC BLOOD PRESSURE: 167 MMHG | DIASTOLIC BLOOD PRESSURE: 66 MMHG

## 2021-11-24 VITALS — SYSTOLIC BLOOD PRESSURE: 149 MMHG | DIASTOLIC BLOOD PRESSURE: 30 MMHG

## 2021-11-24 VITALS — DIASTOLIC BLOOD PRESSURE: 143 MMHG | SYSTOLIC BLOOD PRESSURE: 153 MMHG

## 2021-11-24 VITALS — DIASTOLIC BLOOD PRESSURE: 48 MMHG | SYSTOLIC BLOOD PRESSURE: 84 MMHG

## 2021-11-24 VITALS — SYSTOLIC BLOOD PRESSURE: 151 MMHG | DIASTOLIC BLOOD PRESSURE: 37 MMHG

## 2021-11-24 VITALS — SYSTOLIC BLOOD PRESSURE: 100 MMHG | DIASTOLIC BLOOD PRESSURE: 51 MMHG

## 2021-11-24 VITALS — DIASTOLIC BLOOD PRESSURE: 108 MMHG | SYSTOLIC BLOOD PRESSURE: 134 MMHG

## 2021-11-24 VITALS — SYSTOLIC BLOOD PRESSURE: 102 MMHG | DIASTOLIC BLOOD PRESSURE: 46 MMHG

## 2021-11-24 VITALS — DIASTOLIC BLOOD PRESSURE: 114 MMHG | SYSTOLIC BLOOD PRESSURE: 144 MMHG

## 2021-11-24 VITALS — SYSTOLIC BLOOD PRESSURE: 141 MMHG | DIASTOLIC BLOOD PRESSURE: 43 MMHG

## 2021-11-24 VITALS — DIASTOLIC BLOOD PRESSURE: 74 MMHG | SYSTOLIC BLOOD PRESSURE: 126 MMHG

## 2021-11-24 VITALS — DIASTOLIC BLOOD PRESSURE: 28 MMHG | SYSTOLIC BLOOD PRESSURE: 60 MMHG

## 2021-11-24 VITALS — DIASTOLIC BLOOD PRESSURE: 40 MMHG | SYSTOLIC BLOOD PRESSURE: 141 MMHG

## 2021-11-24 VITALS — SYSTOLIC BLOOD PRESSURE: 154 MMHG | DIASTOLIC BLOOD PRESSURE: 81 MMHG

## 2021-11-24 VITALS — DIASTOLIC BLOOD PRESSURE: 33 MMHG | SYSTOLIC BLOOD PRESSURE: 158 MMHG

## 2021-11-24 LAB
ALBUMIN SERPL BCP-MCNC: 2 G/DL (ref 3.4–5)
ALBUMIN SERPL BCP-MCNC: 2.6 G/DL (ref 3.4–5)
ALP SERPL-CCNC: 393 U/L (ref 46–116)
ALP SERPL-CCNC: 437 U/L (ref 46–116)
ALT SERPL W P-5'-P-CCNC: 10 U/L (ref 12–78)
ALT SERPL W P-5'-P-CCNC: 22 U/L (ref 12–78)
AST SERPL W P-5'-P-CCNC: 23 U/L (ref 15–37)
AST SERPL W P-5'-P-CCNC: 26 U/L (ref 15–37)
BASE EXCESS BLDA CALC-SCNC: -2.1 MMOL/L
BASE EXCESS BLDA CALC-SCNC: -5.6 MMOL/L
BASOPHILS # BLD AUTO: 0.1 K/UL (ref 0–0.2)
BASOPHILS # BLD AUTO: 0.2 K/UL (ref 0–0.2)
BASOPHILS NFR BLD AUTO: 0.7 % (ref 0–2)
BASOPHILS NFR BLD AUTO: 0.7 % (ref 0–2)
BILIRUB SERPL-MCNC: 1.6 MG/DL (ref 0.2–1)
BILIRUB SERPL-MCNC: 1.8 MG/DL (ref 0.2–1)
BUN SERPL-MCNC: 32 MG/DL (ref 7–18)
BUN SERPL-MCNC: 38 MG/DL (ref 7–18)
CALCIUM SERPL-MCNC: 8.1 MG/DL (ref 8.5–10.1)
CALCIUM SERPL-MCNC: 8.5 MG/DL (ref 8.5–10.1)
CHLORIDE SERPL-SCNC: 96 MMOL/L (ref 98–107)
CHLORIDE SERPL-SCNC: 98 MMOL/L (ref 98–107)
CO2 SERPL-SCNC: 16 MMOL/L (ref 21–32)
CO2 SERPL-SCNC: 20 MMOL/L (ref 21–32)
CREAT SERPL-MCNC: 2.8 MG/DL (ref 0.6–1.3)
CREAT SERPL-MCNC: 3 MG/DL (ref 0.6–1.3)
DO-HGB MFR BLDA: 455.1 MMHG
DO-HGB MFR BLDA: 466.6 MMHG
EOSINOPHIL NFR BLD AUTO: 0.1 % (ref 0–6)
EOSINOPHIL NFR BLD AUTO: 0.2 % (ref 0–6)
GLUCOSE SERPL-MCNC: 139 MG/DL (ref 74–106)
GLUCOSE SERPL-MCNC: 54 MG/DL (ref 74–106)
HCT VFR BLD AUTO: 40 % (ref 39–51)
HCT VFR BLD AUTO: 41 % (ref 39–51)
HGB BLD-MCNC: 11.4 G/DL (ref 13.5–17.5)
HGB BLD-MCNC: 11.9 G/DL (ref 13.5–17.5)
INHALED O2 CONCENTRATION: 100 %
INHALED O2 CONCENTRATION: 80 %
INTRINSIC PEEP RESPIRATORY: 5 CM H2O
LYMPHOCYTES NFR BLD AUTO: 1.9 K/UL (ref 0.8–4.8)
LYMPHOCYTES NFR BLD AUTO: 18.5 % (ref 20–44)
LYMPHOCYTES NFR BLD AUTO: 3.3 K/UL (ref 0.8–4.8)
LYMPHOCYTES NFR BLD AUTO: 8.2 % (ref 20–44)
MAGNESIUM SERPL-MCNC: 2.5 MG/DL (ref 1.8–2.4)
MCHC RBC AUTO-ENTMCNC: 28 G/DL (ref 31–36)
MCHC RBC AUTO-ENTMCNC: 29 G/DL (ref 31–36)
MCV RBC AUTO: 85 FL (ref 80–96)
MCV RBC AUTO: 87 FL (ref 80–96)
MONOCYTES NFR BLD AUTO: 0.3 K/UL (ref 0.1–1.3)
MONOCYTES NFR BLD AUTO: 0.5 K/UL (ref 0.1–1.3)
MONOCYTES NFR BLD AUTO: 1.3 % (ref 2–12)
MONOCYTES NFR BLD AUTO: 2.5 % (ref 2–12)
NEUTROPHILS # BLD AUTO: 14.1 K/UL (ref 1.8–8.9)
NEUTROPHILS # BLD AUTO: 20.3 K/UL (ref 1.8–8.9)
NEUTROPHILS NFR BLD AUTO: 78.2 % (ref 43–81)
NEUTROPHILS NFR BLD AUTO: 89.6 % (ref 43–81)
PCO2 TEMP ADJ BLDA: 35.9 MMHG (ref 35–45)
PCO2 TEMP ADJ BLDA: 40.6 MMHG (ref 35–45)
PEEP SETTING VENT: 400 ML
PH TEMP ADJ BLDA: 7.31 [PH] (ref 7.35–7.45)
PH TEMP ADJ BLDA: 7.41 [PH] (ref 7.35–7.45)
PHOSPHATE SERPL-MCNC: 5.9 MG/DL (ref 2.5–4.9)
PLATELET # BLD AUTO: 265 K/UL (ref 150–450)
PLATELET # BLD AUTO: 274 K/UL (ref 150–450)
PO2 TEMP ADJ BLDA: 217.3 MMHG (ref 75–100)
PO2 TEMP ADJ BLDA: 66.1 MMHG (ref 75–100)
POTASSIUM SERPL-SCNC: 4.5 MMOL/L (ref 3.5–5.1)
POTASSIUM SERPL-SCNC: 5.1 MMOL/L (ref 3.5–5.1)
PROT SERPL-MCNC: 7.4 G/DL (ref 6.4–8.2)
PROT SERPL-MCNC: 8.9 G/DL (ref 6.4–8.2)
RBC # BLD AUTO: 4.63 MIL/UL (ref 4.5–6)
RBC # BLD AUTO: 4.83 MIL/UL (ref 4.5–6)
SAO2 % BLDA: 92.4 % (ref 92–98.5)
SAO2 % BLDA: 99.2 % (ref 92–98.5)
SET RATE, BG: 18
SODIUM SERPL-SCNC: 133 MMOL/L (ref 136–145)
SODIUM SERPL-SCNC: 133 MMOL/L (ref 136–145)
VENTILATION MODE VENT: (no result)
WBC NRBC COR # BLD AUTO: 18 K/UL (ref 4.3–11)
WBC NRBC COR # BLD AUTO: 22.6 K/UL (ref 4.3–11)

## 2021-11-24 PROCEDURE — 5A2204Z RESTORATION OF CARDIAC RHYTHM, SINGLE: ICD-10-PCS | Performed by: EMERGENCY MEDICINE

## 2021-11-24 PROCEDURE — 5A1945Z RESPIRATORY VENTILATION, 24-96 CONSECUTIVE HOURS: ICD-10-PCS | Performed by: INTERNAL MEDICINE

## 2021-11-24 PROCEDURE — 0BH18EZ INSERTION OF ENDOTRACHEAL AIRWAY INTO TRACHEA, VIA NATURAL OR ARTIFICIAL OPENING ENDOSCOPIC: ICD-10-PCS | Performed by: EMERGENCY MEDICINE

## 2021-11-24 PROCEDURE — B548ZZA ULTRASONOGRAPHY OF SUPERIOR VENA CAVA, GUIDANCE: ICD-10-PCS | Performed by: NURSE PRACTITIONER

## 2021-11-24 PROCEDURE — 02HV33Z INSERTION OF INFUSION DEVICE INTO SUPERIOR VENA CAVA, PERCUTANEOUS APPROACH: ICD-10-PCS | Performed by: NURSE PRACTITIONER

## 2021-11-24 RX ADMIN — Medication SCH MG: at 06:44

## 2021-11-24 RX ADMIN — FAMOTIDINE SCH MG: 20 TABLET, FILM COATED ORAL at 09:00

## 2021-11-24 RX ADMIN — Medication SCH GM: at 09:00

## 2021-11-24 RX ADMIN — SODIUM CHLORIDE PRN MLS/HR: 9 INJECTION, SOLUTION INTRAVENOUS at 07:58

## 2021-11-24 RX ADMIN — SODIUM CHLORIDE PRN MLS/HR: 9 INJECTION, SOLUTION INTRAVENOUS at 08:40

## 2021-11-24 NOTE — NUR
RN NOTES



PUPILS FIXED, NON REACTIVE AT 3. NO GAG REFLEX, NO COUGHING REFLEX. NO PALPABLE PULSE. CODE 
BLUE STARTED. REFER TO CODE BLUE SHEET.

## 2021-11-24 NOTE — NUR
RECEIVED PT FROM SUE S/P CODE BLUE, ACCOMPANIED BY RT, AND ER MD, ORALLY INTUBATED, VENT 
SETTING PER MD FIO2 100% CURRENTLY SPO2 88%, HOOKED TO MONITOR WITH READING SINUS TACHY, 
CAROTID AND FEMORAL PULSE IS RAPID AND WEAK, PT PUPIL IS FIX AND DILATED, V/S CHECKED AND 
RECORDED, PER SUE NURSE, PT  JUST FINISHED HD @ 3061-9109 WITH OUTPUT OF 2500 ML, SECURE 
ORDER FOR BILATERAL SOFT WRIST RESTRAINT FOR SELF EXTUBATION PRECAUTION,OGTUBE INSERTED 
PLACEMENT CHECKED AND VERIFIED, BED ON LOWEST POSITION AND LOCKED SIDE RAILS UP X2 WILL CONT 
MONITOR

## 2021-11-24 NOTE — NUR
vent changes below per dr. alarcon:





PEEP +12

-------------------------------------------------------------------------------

Addendum: 11/24/21 at 1140 by LAI WEBB RT

-------------------------------------------------------------------------------

Amended: Links added.

## 2021-11-24 NOTE — NUR
DR SALGUERO SEEN THE PT WITH ORDER TO CHANGE THE LEVOPHED TO NEOSYNEPHRINE TITRATE PER PROTOCOL 
NOTED AND CARRIED OUT

## 2021-11-24 NOTE — NUR
ICU RN NOTES



@0840 PT HAS NO PULSE. FIXED NON REACTIVE PUPILS. NO GAG/COUGH REFLEX. PEA ON THE MONITOR. 
CODE BLUE INITIATED. DR HENDERSON, RTs, NURSING SUPERVISOR AT BEDSIDE. PT WAS RESUSCITATED, 
REFER TO CODE BLUE SHEET FOR FULL DETAILS. CALLED WIFE AND GAVE UPDATE ON PT STATUS, WANTS 
EVERYTHING TO BE DONE. STILL FULL CODE. @0940 PEA ON THE MONITOR, NO PULSE, CODE BLUE 
INITIATED. DR. ECKERT AT BEDSIDE. @1030 PEA ON THE MONITOR, NO PULSE, CODE BLUE INITIATED. 
DR. OSORIO AND DR. SMILEY AT BEDSIDE. DR. OSORIO INSERTED A LINE ON R FEMORAL. @1130 PEA 
ON THE MONITOR, NO PULSE, CODE BLUE INITIATED. DR. MONDRAGON AT BEDSIDE. @1218 PEA ON THE 
MONITOR, NO PULSE, CODE BLUE INITIATED. DR. OSORIO AT BEDSIDE. TIME OF DEATH PRONOUNCED. NO 
PALPABLE PULSES. ASYSTOLE ON THE MONITOR. INFORMED WIFE. CALLED ARIAS FROM [facility], 
REFERRAL # [id number]. MD AND NURSING SUPERVISOR AWARE. POST MORTEM CARE DONE. PLACED IN 
BAG. TAG ON BAG AND TOE IN PLACE.  BELONGINGS AT BEDSIDE, INFORMED WIFE AND SAID SHE WILL 
PICK IT UP.

## 2021-11-24 NOTE — NUR
RN NOTES



NO PALPABLE PULSES. THANIA JON STARTED. RT AND DR ECKERT AT BEDSIDE. REFER TO CODE BLUE SHEET.

## 2021-11-24 NOTE — NUR
ICU NOTES



PEA. NO PALPABLE PULSES. CODE BLUE STARTED. DR. MONDRAGON AND RTs AT BEDSIDE. REFER TO 
CODE BLUE SHEET.

## 2021-11-24 NOTE — NUR
RT



responded to code blue. 

pt intubated with 7.5 ett, 24@lip. equal chest rise noted. vent settings: AC 18 400 100% 
+10. vent plugged in to red outlet. alarms on and audible. ambu bag at bedside. 

abg to be done. will continue to monitor.

## 2021-11-24 NOTE — NUR
ICU NOTES



PEA. NO PALPABLE PULSES. CODE BLUE STARTED. DR. IRINEO OSORIO, DR. MONDRAGON AND RTs AT 
BEDSIDE. REFER TO CODE BLUE SHEET.

## 2021-11-24 NOTE — NUR
vent changes below per dr. Hill:



PEEP +8

FIO2 60%

-------------------------------------------------------------------------------

Addendum: 11/24/21 at 0824 by LAI WEBB RT

-------------------------------------------------------------------------------

Amended: Links added.

## 2021-11-24 NOTE — NUR
RN NOTES,

CODE BLUE STARTED, CHARGE NURSE, ICU NURSE, ER DR AND RR TEAM, RTS AT BEDSIDE, CHEST 
COMPRESSIONS IN PROGRESS.

## 2021-11-24 NOTE — NUR
RECEIVED ORDER FROM DR CARD TO START HEPARIN DRIP FOR ACS FOR THIS PT AND ORDER FOR PROPOFOL 
TO TITRATE PER PROTOCOL NOTED AND CARRIED OUT

## 2021-11-24 NOTE — NUR
ICU OPENING NOTES



RECEIVED PT INTUBATED ETT 7.5/22, VENT SETTINGS AC: 18, , FIO2 80% AND PEEP 5. 
TOLERATNG VENT SETTINGS WELL. NO SOB OR ANY S/S OF RESPIRATORY DISTRESS NOTED. IV ACCESS ON 
LFA #20 AND L AC#20 BOTH INTACT, PATENT AND FLUSHED. SEDATED ON DIPRIVAN @20MCG/KG/MIN. LEVO 
@0.2MCG/KG/MIN. ON HEPARIN DRIP. OGT IN PLACE. BILATERAL SOFT WRIST RESTRAINTS NOTED. 
CHECKED FOR CIRCULATION PER PROTOCOL. SAFETY MEASURES IN PLACE. BED LOCKED AND IN LOWEST 
POSITION WITH SIDE RAILS UP X3. WILL CONTINUE TO MONITOR.

## 2021-11-24 NOTE — NUR
RN NOTES,

S/P INTUBATION WITH ORDRES FROM ER DR LIZ  TO DO EKG, CBC, BMP, TROPONIN, CXR, NOTED AND 
CARRIED OUT.

## 2021-11-24 NOTE — NUR
AUGUST NOTES,

CODE FINISHED AT 0009, PATIENT INTUBATED, ALL TEAM AT BEDSIDE, PATIENT WILL BE TRANSFERRED 
TO ICU ROOM 257.

-------------------------------------------------------------------------------

Addendum: 11/24/21 at 0422 by MANE BALLARD RN

-------------------------------------------------------------------------------

PATIENT INTUBATED AT 0012

## 2021-11-24 NOTE — NUR
RN NOTES,

CALLED EX-WIFE BONY  [phone] AND UPDATED HER ABOUT PATIENT CONDITION AND PATIENT NOW 
TRANSFERRED TO ICU ROOM 257, TELEPHONE NUMBER PROVIDED.

## 2022-02-07 NOTE — NUR
ICU/RN: RECEIVED PT FROM CATH LAB. PT ALERT, AWAKE. RECEIVED REPORT FROM JAZMIN RN. PT VSS, 
SINUS ON TELE. ON 2LITERS NASAL CANULA, NO ACUTE DISTRESS NOTED. LEFT FEMORAL SHEATH IN 
PLACE, MINIMAL BLEEDING NOTED, WILL CONTINUE TO MONITOR. STAT PTT ORDERED TO DETERMINE WHEN 
SHEATH WILL BE PULLED. PT INSTRUCTED TO STAY FLAT. WILL CONTINUE TO MONITOR CLOSELY. 4 = No assist / stand by assistance